# Patient Record
Sex: FEMALE | Race: WHITE | NOT HISPANIC OR LATINO | Employment: FULL TIME | URBAN - METROPOLITAN AREA
[De-identification: names, ages, dates, MRNs, and addresses within clinical notes are randomized per-mention and may not be internally consistent; named-entity substitution may affect disease eponyms.]

---

## 2017-07-31 ENCOUNTER — OFFICE VISIT (OUTPATIENT)
Dept: FAMILY MEDICINE | Facility: CLINIC | Age: 57
End: 2017-07-31
Payer: COMMERCIAL

## 2017-07-31 VITALS
HEART RATE: 101 BPM | OXYGEN SATURATION: 100 % | WEIGHT: 146.4 LBS | DIASTOLIC BLOOD PRESSURE: 74 MMHG | SYSTOLIC BLOOD PRESSURE: 111 MMHG

## 2017-07-31 DIAGNOSIS — M65.30 TRIGGER FINGER, ACQUIRED: ICD-10-CM

## 2017-07-31 DIAGNOSIS — M19.041 OSTEOARTHRITIS OF FINGERS OF BOTH HANDS: Primary | ICD-10-CM

## 2017-07-31 DIAGNOSIS — M19.042 OSTEOARTHRITIS OF FINGERS OF BOTH HANDS: Primary | ICD-10-CM

## 2017-07-31 PROCEDURE — 20551 NJX 1 TENDON ORIGIN/INSJ: CPT | Mod: LT | Performed by: PHYSICIAN ASSISTANT

## 2017-07-31 PROCEDURE — 99213 OFFICE O/P EST LOW 20 MIN: CPT | Mod: 25 | Performed by: PHYSICIAN ASSISTANT

## 2017-07-31 RX ORDER — DICLOFENAC SODIUM 75 MG/1
75 TABLET, DELAYED RELEASE ORAL 2 TIMES DAILY PRN
Qty: 30 TABLET | Refills: 1 | Status: SHIPPED | OUTPATIENT
Start: 2017-07-31 | End: 2017-08-30

## 2017-07-31 NOTE — PROGRESS NOTES
SUBJECTIVE:                                                    Tuyet Hernandez is a 56 year old female who presents to clinic today for the following health issues:      Hand pain      Duration: [id number]    Description (location/character/radiation): both hands ache. Tight feeling.    Intensity:  moderate    Accompanying signs and symptoms: ache,    History (similar episodes/previous evaluation): None    Precipitating or alleviating factors: None    Therapies tried and outcome: Aleve, ibuprofen       No neck pain. Hands feel tight . Some pain. No paresthesias. Trigger finger left 4rth finger.      Problem list and histories reviewed & adjusted, as indicated.  Additional history: as documented    There is no problem list on file for this patient.    History reviewed. No pertinent surgical history.    Social History   Substance Use Topics     Smoking status: Never Smoker     Smokeless tobacco: Never Used     Alcohol use No     History reviewed. No pertinent family history.      BP Readings from Last 3 Encounters:   07/31/17 111/74   08/08/14 131/82    Wt Readings from Last 3 Encounters:   07/31/17 146 lb 6.4 oz (66.4 kg)   08/08/14 154 lb (69.9 kg)                        Reviewed and updated as needed this visit by clinical staff     Reviewed and updated as needed this visit by Provider         All other systems negative except as outline above  OBJECTIVE:  Hands/fingers:  heberden's and rahul's nodes present to varying degrees in all of her fingers. rom normal with some stiffness.    Trigger finger present involving 4rth finger. Palpable thickening and tenderness of flexor tendon.          The risks, benefits and potential complications (including but not limited to, bleeding, infection, pain, scar, damage to adjacent structures, atrophy or necrosis of soft tissue, skin blanching, failure to relieve symptoms) of injection were discussed with the patient. Questions were addressed and answered.The patient elected  to proceed. Written informed consent was obtained. The correct procedural site was identified and confirmed. A Left trigger finger  injection was performed using 1/4mL Depo Medrol 40mg per mL and 1/2mL (0.25% marcaine) of local anesthetic after sterile prep, to the correct procedural site. Sterile bandaid applied. This was tolerated well by the patient. No apparent complications.Did also discuss that if diabetic, recommend close monitoring of blood sugars over the next week as cortisone injections can temporarily elevate blood sugars.      Tuyet was seen today for musculoskeletal problem.    Diagnoses and all orders for this visit:    Osteoarthritis of fingers of both hands  -     diclofenac (VOLTAREN) 75 MG EC tablet; Take 1 tablet (75 mg) by mouth 2 times daily as needed for moderate pain    Trigger finger, acquired  -     INJECTION SINGLE TENDON ORIGIN/INSERTION  -     DEPO MEDROL 40 MG      Advised supportive and symptomatic treatment.  Follow up with Provider - if condition persists or worsens.

## 2017-07-31 NOTE — NURSING NOTE
The following medication was given:     MEDICATION: Depo Medrol 40mg  ROUTE: IM  SITE: trigger finger  DOSE: 1/4 cc  LOT #: [id number]  :  Niels Lily [employer] Ellie  EXPIRATION DATE:  10/2019  NDC#: 7129-5113-92

## 2017-07-31 NOTE — NURSING NOTE
Chief Complaint   Patient presents with     Musculoskeletal Problem       Initial /74  Pulse 101  Wt 146 lb 6.4 oz (66.4 kg)  SpO2 100% There is no height or weight on file to calculate BMI.  Medication Reconciliation: complete     Bob Meza CMA

## 2017-07-31 NOTE — PATIENT INSTRUCTIONS
What is Trigger Finger?  Trigger finger is an inflammation of tissue inside your finger or thumb. It is also called tenosynovitis (ten-oh-sin-oh-VY-tis). Tendons (cordlike fibers that attach muscle to bone and allow you to bend the joints) become swollen. So does the synovium (a slick membrane that allows the tendons to move easily). This makes it difficult to straighten the finger or thumb.    Causes  Repeated use of a tool with strong gripping, such as a drill or wrench, can irritate and inflame the tendons and the synovium. It is also more common in certain medical conditions such as rheumatoid arthritis, gout, and diabetes. But often the cause of trigger finger is unknown.  Inside your finger  Tendons connect muscles in your forearm to the bones in your fingers. The tendons in each finger are surrounded by a protective tendon sheath. This sheath is lined with synovium, which produces a fluid that allows the tendons to slide easily when you bend and straighten the finger. If a tendon is irritated, it becomes inflamed.  When a tendon is inflamed  When a tendon is inflamed, it causes the lining of the tendon sheath to swell and thicken. Or the tendon itself may thicken. Then the sheath pinches the tendon, and the tendon can no longer slide easily inside the sheath. When you straighten your finger, the tendon sticks or  locks  as it tries to squeeze back through the sheath.    Symptoms  The first sign of trigger finger may be pain where the finger or thumb joins the palm. You may also notice some swelling. As the tendon becomes more inflamed, the finger may start to catch when you try to straighten it. When the locked tendon releases, the finger jumps, as if you were releasing the trigger of a gun. This further irritates the tendon, and may set up a cycle of catching and swelling.   Date Last Reviewed: 9/28/2015 2000-2017 [employer]. [address] [place] [address], [place] [address]. All rights reserved.  This information is not intended as a substitute for professional medical care. Always follow your healthcare professional's instructions.        Treating Trigger Finger     The tendon sheath is opened to release the tendon. Once the tendon can move freely again, the finger can bend and straighten more normally.     Trigger finger occurs when the tissue inside your finger or thumb becomes inflamed. Mild cases can be treated without surgery. If the problem is severe, surgery may be needed. Your doctor will discuss your options with you.  Nonsurgical treatment  For mild symptoms, your doctor may have you rest the finger or thumb. You may also be told to take anti-inflammatory medicines. These include ibuprofen or aspirin. You may be given an injection of medicine in the base of the finger or thumb. This typically is a steroid, such as cortisone.  Surgery  If nonsurgical treatments don t ease your symptoms, surgery may be recommended. A tendon is a cordlike fiber that attaches muscle to bone and allows joints to bend. The tendon is surrounded by a protective cover called a sheath. During surgery, the sheath in your finger or thumb is opened to enlarge the space and release the swollen tendon. This allows the finger or thumb to bend and straighten normally. Surgery takes about 20 minutes. It can often be done using a local anesthetic. You may be able to go home the same day. Your hand will be wrapped in a soft bandage. You may need to wear a plaster splint for a short time to keep the finger or thumb still as it heals. The stitches will be removed in about 2 weeks. Your doctor can discuss the risks and benefits of surgery with you.  Date Last Reviewed: 9/21/2015 2000-2017 [employer]. [address] [place] [address]. All rights reserved. This information is not intended as a substitute for professional medical care. Always follow your healthcare professional's instructions.

## 2017-07-31 NOTE — MR AVS SNAPSHOT
After Visit Summary   7/31/2017    Tuyet Hernandez    MRN: [id number]           Patient Information     Date Of Birth          1960        Visit Information        Provider Department      7/31/2017 5:40 PM Wes Lewis PA-C [facility]        Today's Diagnoses     Osteoarthritis of fingers of both hands    -  1    Trigger finger, acquired          Care Instructions      What is Trigger Finger?  Trigger finger is an inflammation of tissue inside your finger or thumb. It is also called tenosynovitis (ten-oh-sin-oh-VY-tis). Tendons (cordlike fibers that attach muscle to bone and allow you to bend the joints) become swollen. So does the synovium (a slick membrane that allows the tendons to move easily). This makes it difficult to straighten the finger or thumb.    Causes  Repeated use of a tool with strong gripping, such as a drill or wrench, can irritate and inflame the tendons and the synovium. It is also more common in certain medical conditions such as rheumatoid arthritis, gout, and diabetes. But often the cause of trigger finger is unknown.  Inside your finger  Tendons connect muscles in your forearm to the bones in your fingers. The tendons in each finger are surrounded by a protective tendon sheath. This sheath is lined with synovium, which produces a fluid that allows the tendons to slide easily when you bend and straighten the finger. If a tendon is irritated, it becomes inflamed.  When a tendon is inflamed  When a tendon is inflamed, it causes the lining of the tendon sheath to swell and thicken. Or the tendon itself may thicken. Then the sheath pinches the tendon, and the tendon can no longer slide easily inside the sheath. When you straighten your finger, the tendon sticks or  locks  as it tries to squeeze back through the sheath.    Symptoms  The first sign of trigger finger may be pain where the finger or thumb joins the palm. You may also notice some swelling. As  the tendon becomes more inflamed, the finger may start to catch when you try to straighten it. When the locked tendon releases, the finger jumps, as if you were releasing the trigger of a gun. This further irritates the tendon, and may set up a cycle of catching and swelling.   Date Last Reviewed: 9/28/2015 2000-2017 [employer]. [address] [place] [address]. All rights reserved. This information is not intended as a substitute for professional medical care. Always follow your healthcare professional's instructions.        Treating Trigger Finger     The tendon sheath is opened to release the tendon. Once the tendon can move freely again, the finger can bend and straighten more normally.     Trigger finger occurs when the tissue inside your finger or thumb becomes inflamed. Mild cases can be treated without surgery. If the problem is severe, surgery may be needed. Your doctor will discuss your options with you.  Nonsurgical treatment  For mild symptoms, your doctor may have you rest the finger or thumb. You may also be told to take anti-inflammatory medicines. These include ibuprofen or aspirin. You may be given an injection of medicine in the base of the finger or thumb. This typically is a steroid, such as cortisone.  Surgery  If nonsurgical treatments don t ease your symptoms, surgery may be recommended. A tendon is a cordlike fiber that attaches muscle to bone and allows joints to bend. The tendon is surrounded by a protective cover called a sheath. During surgery, the sheath in your finger or thumb is opened to enlarge the space and release the swollen tendon. This allows the finger or thumb to bend and straighten normally. Surgery takes about 20 minutes. It can often be done using a local anesthetic. You may be able to go home the same day. Your hand will be wrapped in a soft bandage. You may need to wear a plaster splint for a short time to keep the finger or thumb still as it  "heals. The stitches will be removed in about 2 weeks. Your doctor can discuss the risks and benefits of surgery with you.  Date Last Reviewed: 2015-2017 The [employer]. [address] [place] [address], [place] [address]. All rights reserved. This information is not intended as a substitute for professional medical care. Always follow your healthcare professional's instructions.                Follow-ups after your visit        Who to contact     Normal or non-critical lab and imaging results will be communicated to you by "[employer]hart, letter or phone within 4 business days after the clinic has received the results. If you do not hear from us within 7 days, please contact the clinic through [employer] or phone. If you have a critical or abnormal lab result, we will notify you by phone as soon as possible.  Submit refill requests through [employer] or call your pharmacy and they will forward the refill request to us. Please allow 3 business days for your refill to be completed.          If you need to speak with a  for additional information , please call: [phone]             Additional Information About Your Visit        [employer] Information     [employer] lets you send messages to your doctor, view your test results, renew your prescriptions, schedule appointments and more. To sign up, go to [email or web address] . Click on \"Log in\" on the left side of the screen, which will take you to the Welcome page. Then click on \"Sign up Now\" on the right side of the page.     You will be asked to enter the access code listed below, as well as some personal information. Please follow the directions to create your username and password.     Your access code is: [email or web address]  Expires: 10/29/2017  6:31 PM     Your access code will  in 90 days. If you need help or a new code, please call your [facility] clinic or [phone].        Care EveryWhere ID     This is your Care EveryWhere ID. This could be " used by other organizations to access your [facility] medical records  [id number]        Your Vitals Were     Pulse Pulse Oximetry                101 100%           Blood Pressure from Last 3 Encounters:   07/31/17 111/74   08/08/14 131/82    Weight from Last 3 Encounters:   07/31/17 146 lb 6.4 oz (66.4 kg)   08/08/14 154 lb (69.9 kg)              We Performed the Following     DEPO MEDROL 40 MG     INJECTION SINGLE TENDON ORIGIN/INSERTION          Today's Medication Changes          These changes are accurate as of: 7/31/17  6:31 PM.  If you have any questions, ask your nurse or doctor.               Start taking these medicines.        Dose/Directions    diclofenac 75 MG EC tablet   Commonly known as:  VOLTAREN   Used for:  Osteoarthritis of fingers of both hands   Started by:  Wes Lewis PA-C        Dose:  75 mg   Take 1 tablet (75 mg) by mouth 2 times daily as needed for moderate pain   Quantity:  30 tablet   Refills:  1            Where to get your medicines      These medications were sent to [facility]/pharmacy #[id number] - [place], [place] - [address] [place] [address] AT [address], [place] [address]     Phone:  [phone]     diclofenac 75 MG EC tablet                Primary Care Provider    None Specified       No primary provider on file.        Equal Access to Services     TREVIN MAGDALENO : Hannah maynardo Socate, waaxda luqadaha, qaybta kaalmada juan joseda, arash ventura. So [facility] [phone].    ATENCIÓN: Si habla español, tiene a emery disposición servicios gratuitos de asistencia lingüística. Llduane al [phone].    We comply with applicable federal civil rights laws and [place] laws. We do not discriminate on the basis of race, color, national origin, age, disability sex, sexual orientation or gender identity.            Thank you!     Thank you for choosing [facility] [place]  for your care. Our goal is always to provide you with  excellent care. Hearing back from our patients is one way we can continue to improve our services. Please take a few minutes to complete the written survey that you may receive in the mail after your visit with us. Thank you!             Your Updated Medication List - Protect others around you: Learn how to safely use, store and throw away your medicines at www.disposemymeds.org.          This list is accurate as of: 7/31/17  6:31 PM.  Always use your most recent med list.                   Brand Name Dispense Instructions for use Diagnosis    diclofenac 75 MG EC tablet    VOLTAREN    30 tablet    Take 1 tablet (75 mg) by mouth 2 times daily as needed for moderate pain    Osteoarthritis of fingers of both hands

## 2017-08-30 DIAGNOSIS — M19.041 OSTEOARTHRITIS OF FINGERS OF BOTH HANDS: ICD-10-CM

## 2017-08-30 DIAGNOSIS — M19.042 OSTEOARTHRITIS OF FINGERS OF BOTH HANDS: ICD-10-CM

## 2017-08-30 NOTE — TELEPHONE ENCOUNTER
90 day supply please    Diclofenac      Last Written Prescription Date: 07/31/17  Last Quantity: 30, # refills: 1  Last Office Visit with G, P or [facility] prescribing provider: 07/31/17       No results found for: CR  No results found for: AST  No results found for: ALT  BP Readings from Last 3 Encounters:   07/31/17 111/74   08/08/14 131/82

## 2017-08-30 NOTE — TELEPHONE ENCOUNTER
Routing refill request to provider for review/approval because:  Labs not current:  Creatinine, AST/ALT  Pharmacy requesting 90 day supply  Medication pended 90 tabs with 1 refill for approval.

## 2017-08-31 RX ORDER — DICLOFENAC SODIUM 75 MG/1
75 TABLET, DELAYED RELEASE ORAL 2 TIMES DAILY PRN
Qty: 90 TABLET | Refills: 1 | Status: SHIPPED | OUTPATIENT
Start: 2017-08-31 | End: 2018-03-23

## 2018-03-11 ENCOUNTER — OFFICE VISIT (OUTPATIENT)
Dept: URGENT CARE | Facility: URGENT CARE | Age: 58
End: 2018-03-11
Payer: COMMERCIAL

## 2018-03-11 VITALS
WEIGHT: 145.13 LBS | SYSTOLIC BLOOD PRESSURE: 122 MMHG | OXYGEN SATURATION: 97 % | DIASTOLIC BLOOD PRESSURE: 82 MMHG | TEMPERATURE: 97 F | HEART RATE: 89 BPM

## 2018-03-11 DIAGNOSIS — A09 TRAVELER'S DIARRHEA: Primary | ICD-10-CM

## 2018-03-11 PROCEDURE — 99213 OFFICE O/P EST LOW 20 MIN: CPT | Performed by: FAMILY MEDICINE

## 2018-03-11 RX ORDER — CIPROFLOXACIN 500 MG/1
500 TABLET, FILM COATED ORAL 2 TIMES DAILY
Qty: 6 TABLET | Refills: 0 | Status: SHIPPED | OUTPATIENT
Start: 2018-03-11 | End: 2018-03-14

## 2018-03-11 NOTE — NURSING NOTE
Chief Complaint   Patient presents with     Diarrhea     Friday it started.  Has not eaten since then, unable to keep anything in system.  She has been in [place]. Today already has had 5 BM, is only water at this point.       Initial /82 (BP Location: Right arm, Patient Position: Sitting, Cuff Size: Adult Regular)  Pulse 89  Temp 97  F (36.1  C) (Oral)  Wt 145 lb 2 oz (65.8 kg)  SpO2 97% There is no height or weight on file to calculate BMI.  Medication Reconciliation: christine ARELLANO, Certified Medical Assistant (AAMA)March 11, 2018 9:11 AM

## 2018-03-11 NOTE — PATIENT INSTRUCTIONS
Traveler's Diarrhea (Adult)    Traveler's diarrhea is an infection in the intestinal tract caused by bacteria called E coli. This bacteria is commonly found in water supplies of developing countries. The local people of those countries are used to E coli in the water and don't get sick. Tourists who drink contaminated water or eat foods that were washed or prepared with this water may become very ill.  The illness begins 1 to 3 days after exposure and lasts up to 5 days. Symptoms include fever, vomiting, stomach cramping and watery diarrhea. There may also be blood or mucus in the stool. Mild cases will get better without treatment. Antibiotics are used for more severe cases.  Home care    If you were prescribed antibiotics,  take them until they are finished.    You may use acetaminophen or ibuprofen to control fever, unless another medicine was prescribed. If you have chronic liver or kidney disease or have ever had a stomach ulcer or gastrointestinal  bleeding, talk with the doctor before using these medicines. Aspirin should never be used in anyone under 18 years of age who is ill with a fever. It may cause severe illness or death.    Do not take over-the-counter anti-diarrheal medicines, unless advised by the doctor.  Once vomiting stops, follow these guidelines:  During the first 12 to 24 hours, follow the diet below:    Fruit juices. Apple, grape and cranberry juice; clear fruit drinks, electrolyte replacement and sports drinks.    Beverages. Soft drinks without caffeine; mineral water (plain or flavored); decaffeinated tea and coffee    Soups. Clear broth, consommé and bouillon.    Desserts. Plain gelatin, popsicles and fruit juice bars; As you feel better, you may add 6 to 8 oz of yogurt per day.  During the next 24 hours, you may add the following to the above:    Hot cereal, plain toast, bread, rolls, crackers    Plain noodles, rice, mashed potatoes, chicken noodle or rice soup    Unsweetened canned  fruit (avoid pineapple), bananas    Limit fat intake to less than 15 grams per day by avoiding margarine, butter, oils, mayonnaise, sauces, gravies, fried foods, peanut butter, meat, poultry and fish.    Limit fiber; avoid raw or cooked vegetables, fresh fruits (except bananas) and bran cereals.    Limit caffeine and chocolate. No spices or seasonings except salt.  During the next 24 hours you can gradually resume a normal diet as the symptoms lessen.  Follow-up care  Follow up with your healthcare provider, or as advised. Call if you are not improving within 24 hours or if the diarrhea lasts more than 1 week on antibiotics. If a stool (diarrhea) sample was taken, you may call in 2 days (or as directed) for the results.  When to seek medical advice  Call your healthcare provider if any of these occur:    Severe constant pain in the lower part of the abdomen, on the right side    Blood in diarrhea or vomit, dark coffee ground appearing vomit, or dark tarry stools    continued vomiting (unable to keep liquids down)    Frequent diarrhea (more than 5 times a day); blood (red or black) or mucus in diarrhea    Reduced oral intake    Reduced urine output or extreme thirst    Weakness, dizziness, fainting    Drowsiness, confusion, stiff neck, or seizure    Fever (1 degree above your normal temperature) lasting 24 to 48 hours, or whatever your healthcare provider told you to report based on your condition  Date Last Reviewed: 11/16/2015 2000-2017 [employer]. [address] [place] [address]. All rights reserved. This information is not intended as a substitute for professional medical care. Always follow your healthcare professional's instructions.

## 2018-03-11 NOTE — PROGRESS NOTES
SUBJECTIVE:  Tuyet Hernandez, a 57 year old female scheduled an appointment to discuss the following issues:  Traveler's diarrhea    Medical, social, surgical, and family histories reviewed.    Diarrhea  Friday it started. Has not eaten since then, unable to keep anything in system. She has been in [place]. Today already has had 5 BM, is only water at this point.      As above.  Patient was in [place] until yesterday; onset of diarrhea since the day before.  She is not aware of having eaten or drank anything from the roadside; she stated all inclusive hotel.  Decreased appetite.  No melena or his medication      ROS:  See HPI.  No nausea/vomiting.  No fever/chills.  No chest pain/SOB.  No urine problems.  No dizziness or syncope.      OBJECTIVE:  /82 (BP Location: Right arm, Patient Position: Sitting, Cuff Size: Adult Regular)  Pulse 89  Temp 97  F (36.1  C) (Oral)  Wt 145 lb 2 oz (65.8 kg)  SpO2 97%  EXAM:  GENERAL APPEARANCE:  alert and mild distress; no cyanosis or accessory muscle use, moist mucous membrane  EYES: Eyes grossly normal to inspection, PERRL and conjunctivae and sclerae normal  HENT: ear canals and TM's normal and nose and mouth without ulcers or lesions  NECK: no adenopathy, no asymmetry, masses, or scars and thyroid normal to palpation  RESP: lungs clear to auscultation - no rales, rhonchi or wheezes  CV: regular rates and rhythm, normal S1 S2, no S3 or S4 and no murmur, click or rub  LYMPHATICS: no cervical adenopathy  ABDOMEN: soft, nontender, without hepatosplenomegaly or masses and bowel sounds normal; no CVA tenderness  MS: extremities normal- no gross deformities noted  SKIN: no suspicious lesions or rashes  NEURO: Normal strength and tone, mentation intact and speech normal    ASSESSMENT/PLAN:  (A09) Traveler's diarrhea  (primary encounter diagnosis)  Plan: ciprofloxacin (CIPRO) 500 MG tablet       Care instructions given.  Pt to f/up PCP if no improvement or worsening.  Warning  signs and symptoms explained.--Be seen ASAP if worsening.

## 2018-03-11 NOTE — MR AVS SNAPSHOT
After Visit Summary   3/11/2018    Tuyet Hernandez    MRN: [id number]           Patient Information     Date Of Birth          1960        Visit Information        Provider Department      3/11/2018 9:05 AM Maroc Hinton MD [facility]        Today's Diagnoses     Traveler's diarrhea    -  1      Care Instructions      Traveler's Diarrhea (Adult)    Traveler's diarrhea is an infection in the intestinal tract caused by bacteria called E coli. This bacteria is commonly found in water supplies of developing countries. The local people of those countries are used to E coli in the water and don't get sick. Tourists who drink contaminated water or eat foods that were washed or prepared with this water may become very ill.  The illness begins 1 to 3 days after exposure and lasts up to 5 days. Symptoms include fever, vomiting, stomach cramping and watery diarrhea. There may also be blood or mucus in the stool. Mild cases will get better without treatment. Antibiotics are used for more severe cases.  Home care    If you were prescribed antibiotics,  take them until they are finished.    You may use acetaminophen or ibuprofen to control fever, unless another medicine was prescribed. If you have chronic liver or kidney disease or have ever had a stomach ulcer or gastrointestinal  bleeding, talk with the doctor before using these medicines. Aspirin should never be used in anyone under 18 years of age who is ill with a fever. It may cause severe illness or death.    Do not take over-the-counter anti-diarrheal medicines, unless advised by the doctor.  Once vomiting stops, follow these guidelines:  During the first 12 to 24 hours, follow the diet below:    Fruit juices. Apple, grape and cranberry juice; clear fruit drinks, electrolyte replacement and sports drinks.    Beverages. Soft drinks without caffeine; mineral water (plain or flavored); decaffeinated tea and coffee    Soups. Clear broth,  consommé and bouillon.    Desserts. Plain gelatin, popsicles and fruit juice bars; As you feel better, you may add 6 to 8 oz of yogurt per day.  During the next 24 hours, you may add the following to the above:    Hot cereal, plain toast, bread, rolls, crackers    Plain noodles, rice, mashed potatoes, chicken noodle or rice soup    Unsweetened canned fruit (avoid pineapple), bananas    Limit fat intake to less than 15 grams per day by avoiding margarine, butter, oils, mayonnaise, sauces, gravies, fried foods, peanut butter, meat, poultry and fish.    Limit fiber; avoid raw or cooked vegetables, fresh fruits (except bananas) and bran cereals.    Limit caffeine and chocolate. No spices or seasonings except salt.  During the next 24 hours you can gradually resume a normal diet as the symptoms lessen.  Follow-up care  Follow up with your healthcare provider, or as advised. Call if you are not improving within 24 hours or if the diarrhea lasts more than 1 week on antibiotics. If a stool (diarrhea) sample was taken, you may call in 2 days (or as directed) for the results.  When to seek medical advice  Call your healthcare provider if any of these occur:    Severe constant pain in the lower part of the abdomen, on the right side    Blood in diarrhea or vomit, dark coffee ground appearing vomit, or dark tarry stools    continued vomiting (unable to keep liquids down)    Frequent diarrhea (more than 5 times a day); blood (red or black) or mucus in diarrhea    Reduced oral intake    Reduced urine output or extreme thirst    Weakness, dizziness, fainting    Drowsiness, confusion, stiff neck, or seizure    Fever (1 degree above your normal temperature) lasting 24 to 48 hours, or whatever your healthcare provider told you to report based on your condition  Date Last Reviewed: 11/16/2015 2000-2017 [employer] [address] [place] [address], [place] [address]. All rights reserved. This information is not intended as a  "substitute for professional medical care. Always follow your healthcare professional's instructions.                Follow-ups after your visit        Who to contact     If you have questions or need follow up information about today's clinic visit or your schedule please contact [facility] directly at [phone].  Normal or non-critical lab and imaging results will be communicated to you by MyChart, letter or phone within 4 business days after the clinic has received the results. If you do not hear from us within 7 days, please contact the clinic through MyChart or phone. If you have a critical or abnormal lab result, we will notify you by phone as soon as possible.  Submit refill requests through NUOFFER or call your pharmacy and they will forward the refill request to us. Please allow 3 business days for your refill to be completed.          Additional Information About Your Visit        MyChart Information     NUOFFER lets you send messages to your doctor, view your test results, renew your prescriptions, schedule appointments and more. To sign up, go to [email or web address] . Click on \"Log in\" on the left side of the screen, which will take you to the Welcome page. Then click on \"Sign up Now\" on the right side of the page.     You will be asked to enter the access code listed below, as well as some personal information. Please follow the directions to create your username and password.     Your access code is: [email or web address]  Expires: 2018  9:28 AM     Your access code will  in 90 days. If you need help or a new code, please call your [facility] or [phone].        Care EveryWhere ID     This is your Care EveryWhere ID. This could be used by other organizations to access your [facility] medical records  [id number]        Your Vitals Were     Pulse Temperature Pulse Oximetry             89 97  F (36.1  C) (Oral) 97%          Blood Pressure from Last 3 Encounters:   18 " 122/82   07/31/17 111/74   08/08/14 131/82    Weight from Last 3 Encounters:   03/11/18 145 lb 2 oz (65.8 kg)   07/31/17 146 lb 6.4 oz (66.4 kg)   08/08/14 154 lb (69.9 kg)              Today, you had the following     No orders found for display         Today's Medication Changes          These changes are accurate as of 3/11/18  9:28 AM.  If you have any questions, ask your nurse or doctor.               Start taking these medicines.        Dose/Directions    ciprofloxacin 500 MG tablet   Commonly known as:  CIPRO   Used for:  Traveler's diarrhea   Started by:  Marco Hinton MD        Dose:  500 mg   Take 1 tablet (500 mg) by mouth 2 times daily for 3 days   Quantity:  6 tablet   Refills:  0            Where to get your medicines      These medications were sent to [facility] [address] [place] [address] [place], [place] - [address] [facility] AT [address]  [address] [facility] [place] [address]     Phone:  [phone]     ciprofloxacin 500 MG tablet                Primary Care Provider Office Phone # Fax #    [facility] [phone]       [address] [place] [address]        Equal Access to Services     [facility]: Hadii aad ku hadasho Soomaali, waaxda luqadaha, qaybta kaalmada adeegyada, waxay idiin hayaan brice houston . So [facility] [phone].    ATENCIÓN: Si habla español, tiene a emery disposición servicios gratuitos de asistencia lingüística. Llduane al [phone].    We comply with applicable federal civil rights laws and [place] laws. We do not discriminate on the basis of race, color, national origin, age, disability, sex, sexual orientation, or gender identity.            Thank you!     Thank you for choosing [facility]  [facility] your care. Our goal is always to provide you with excellent care. Hearing back from our patients is one way we can continue to improve our services. Please take a few minutes to complete the written survey that you may  receive in the mail after your visit with us. Thank you!             Your Updated Medication List - Protect others around you: Learn how to safely use, store and throw away your medicines at www.disposemymeds.org.          This list is accurate as of 3/11/18  9:28 AM.  Always use your most recent med list.                   Brand Name Dispense Instructions for use Diagnosis    ciprofloxacin 500 MG tablet    CIPRO    6 tablet    Take 1 tablet (500 mg) by mouth 2 times daily for 3 days    Traveler's diarrhea       diclofenac 75 MG EC tablet    VOLTAREN    90 tablet    Take 1 tablet (75 mg) by mouth 2 times daily as needed for moderate pain    Osteoarthritis of fingers of both hands

## 2018-03-23 ENCOUNTER — OFFICE VISIT (OUTPATIENT)
Dept: FAMILY MEDICINE | Facility: CLINIC | Age: 58
End: 2018-03-23
Payer: COMMERCIAL

## 2018-03-23 VITALS
SYSTOLIC BLOOD PRESSURE: 110 MMHG | HEART RATE: 71 BPM | RESPIRATION RATE: 16 BRPM | BODY MASS INDEX: 22.73 KG/M2 | DIASTOLIC BLOOD PRESSURE: 70 MMHG | HEIGHT: 68 IN | OXYGEN SATURATION: 100 % | WEIGHT: 150 LBS

## 2018-03-23 DIAGNOSIS — M25.562 ACUTE PAIN OF LEFT KNEE: ICD-10-CM

## 2018-03-23 DIAGNOSIS — M65.30 TRIGGER FINGER, ACQUIRED: Primary | ICD-10-CM

## 2018-03-23 PROCEDURE — 99213 OFFICE O/P EST LOW 20 MIN: CPT | Mod: 25 | Performed by: PHYSICIAN ASSISTANT

## 2018-03-23 PROCEDURE — 20610 DRAIN/INJ JOINT/BURSA W/O US: CPT | Performed by: PHYSICIAN ASSISTANT

## 2018-03-23 RX ORDER — DICLOFENAC SODIUM 75 MG/1
75 TABLET, DELAYED RELEASE ORAL 2 TIMES DAILY PRN
Qty: 30 TABLET | Refills: 1 | Status: SHIPPED | OUTPATIENT
Start: 2018-03-23 | End: 2018-05-11

## 2018-03-23 NOTE — MR AVS SNAPSHOT
After Visit Summary   3/23/2018    Tuyet Hernandez    MRN: [id number]           Patient Information     Date Of Birth          1960        Visit Information        Provider Department      3/23/2018 4:00 PM Wes Lewis PA-C [facility]        Today's Diagnoses     Trigger finger, acquired    -  1    Acute pain of left knee           Follow-ups after your visit        Additional Services     ORTHOPEDICS ADULT REFERRAL       Your provider has referred you to: [facility] - Be [phone]   [email or web address]    Please be aware that coverage of these services is subject to the terms and limitations of your health insurance plan.  Call member services at your health plan with any benefit or coverage questions.      Please bring the following to your appointment:    >>   Any x-rays, CTs or MRIs which have been performed.  Contact the facility where they were done to arrange for  prior to your scheduled appointment.    >>   List of current medications   >>   This referral request   >>   Any documents/labs given to you for this referral                  Who to contact     Normal or non-critical lab and imaging results will be communicated to you by Hinacomhart, letter or phone within 4 business days after the clinic has received the results. If you do not hear from us within 7 days, please contact the clinic through Hinacomhart or phone. If you have a critical or abnormal lab result, we will notify you by phone as soon as possible.  Submit refill requests through SendGrid or call your pharmacy and they will forward the refill request to us. Please allow 3 business days for your refill to be completed.          If you need to speak with a  for additional information , please call: [phone]             Additional Information About Your Visit        HinacomharAppUpper - ASO Information     SendGrid lets you send messages to  "your doctor, view your test results, renew your prescriptions, schedule appointments and more. To sign up, go to [email or web address] . Click on \"Log in\" on the left side of the screen, which will take you to the Welcome page. Then click on \"Sign up Now\" on the right side of the page.     You will be asked to enter the access code listed below, as well as some personal information. Please follow the directions to create your username and password.     Your access code is: [email or web address]  Expires: 2018  9:28 AM     Your access code will  in 90 days. If you need help or a new code, please call your [facility] clinic or [phone].        Care EveryWhere ID     This is your Care EveryWhere ID. This could be used by other organizations to access your [facility] medical records  [id number]        Your Vitals Were     Pulse Respirations Height Pulse Oximetry BMI (Body Mass Index)       71 16 5' 8\" (1.727 m) 100% 22.81 kg/m2        Blood Pressure from Last 3 Encounters:   18 110/70   18 122/82   17 111/74    Weight from Last 3 Encounters:   18 150 lb (68 kg)   18 145 lb 2 oz (65.8 kg)   17 146 lb 6.4 oz (66.4 kg)              We Performed the Following     Colonoscopy - HIM Scan     Colonoscopy - HIM Scan     Colonoscopy - HIM Scan     DEPO MEDROL 40 MG     DRAIN/INJECT LARGE JOINT/BURSA     Mammogram - HIM Scan     Mammogram - HIM Scan     ORTHOPEDICS ADULT REFERRAL          Where to get your medicines      These medications were sent to [facility] [address] - [place] - [address] [facility] AT [address]  [address] [facility] [place] [address]     Phone:  [phone]     diclofenac 75 MG EC tablet          Primary Care Provider Office Phone # Fax #    [facility] [phone] [address] [facility] [place] [address]        Equal Access to Services     TREVIN MAGDALENO AH: Hannah Lisa nikolas cardoso, [facility] " arash bergerdavid houston ah. So [facility] [phone].    ATENCIÓN: Si dionne fields, tiene a emery disposición servicios gratuitos de asistencia lingüística. Andreina al [phone].    We comply with applicable federal civil rights laws and [place] laws. We do not discriminate on the basis of race, color, national origin, age, disability, sex, sexual orientation, or gender identity.            Thank you!     Thank you for choosing [facility]  for your care. Our goal is always to provide you with excellent care. Hearing back from our patients is one way we can continue to improve our services. Please take a few minutes to complete the written survey that you may receive in the mail after your visit with us. Thank you!             Your Updated Medication List - Protect others around you: Learn how to safely use, store and throw away your medicines at www.disposemymeds.org.          This list is accurate as of 3/23/18  5:07 PM.  Always use your most recent med list.                   Brand Name Dispense Instructions for use Diagnosis    diclofenac 75 MG EC tablet    VOLTAREN    30 tablet    Take 1 tablet (75 mg) by mouth 2 times daily as needed for moderate pain    Acute pain of left knee

## 2018-04-16 ENCOUNTER — TRANSFERRED RECORDS (OUTPATIENT)
Dept: HEALTH INFORMATION MANAGEMENT | Facility: CLINIC | Age: 58
End: 2018-04-16

## 2018-05-07 ENCOUNTER — TRANSFERRED RECORDS (OUTPATIENT)
Dept: HEALTH INFORMATION MANAGEMENT | Facility: CLINIC | Age: 58
End: 2018-05-07

## 2018-05-11 ENCOUNTER — OFFICE VISIT (OUTPATIENT)
Dept: FAMILY MEDICINE | Facility: CLINIC | Age: 58
End: 2018-05-11
Payer: COMMERCIAL

## 2018-05-11 VITALS
BODY MASS INDEX: 22.58 KG/M2 | HEIGHT: 68 IN | SYSTOLIC BLOOD PRESSURE: 129 MMHG | RESPIRATION RATE: 17 BRPM | DIASTOLIC BLOOD PRESSURE: 74 MMHG | TEMPERATURE: 98.5 F | HEART RATE: 77 BPM | WEIGHT: 149 LBS | OXYGEN SATURATION: 98 %

## 2018-05-11 DIAGNOSIS — S83.232D COMPLEX TEAR OF MEDIAL MENISCUS OF LEFT KNEE AS CURRENT INJURY, SUBSEQUENT ENCOUNTER: ICD-10-CM

## 2018-05-11 DIAGNOSIS — M65.30 TRIGGER FINGER, ACQUIRED: ICD-10-CM

## 2018-05-11 DIAGNOSIS — Z01.818 PREOP GENERAL PHYSICAL EXAM: Primary | ICD-10-CM

## 2018-05-11 PROCEDURE — 93000 ELECTROCARDIOGRAM COMPLETE: CPT | Performed by: PHYSICIAN ASSISTANT

## 2018-05-11 PROCEDURE — 99214 OFFICE O/P EST MOD 30 MIN: CPT | Performed by: PHYSICIAN ASSISTANT

## 2018-05-11 NOTE — MR AVS SNAPSHOT
After Visit Summary   5/11/2018    Tuyet Hernandez    MRN: [id number]           Patient Information     Date Of Birth          1960        Visit Information        Provider Department      5/11/2018 1:20 PM Wes Lewis PA-C [facility]        Today's Diagnoses     Preop general physical exam    -  1    Complex tear of medial meniscus of left knee as current injury, subsequent encounter        Trigger finger, acquired          Care Instructions      Before Your Surgery      Call your surgeon if there is any change in your health. This includes signs of a cold or flu (such as a sore throat, runny nose, cough, rash or fever).    Do not smoke, drink alcohol or take over the counter medicine (unless your surgeon or primary care doctor tells you to) for the 24 hours before and after surgery.    If you take prescribed drugs: Follow your doctor s orders about which medicines to take and which to stop until after surgery.    Eating and drinking prior to surgery: follow the instructions from your surgeon    Take a shower or bath the night before surgery. Use the soap your surgeon gave you to gently clean your skin. If you do not have soap from your surgeon, use your regular soap. Do not shave or scrub the surgery site.  Wear clean pajamas and have clean sheets on your bed.     Before Your Surgery      Call your surgeon if there is any change in your health. This includes signs of a cold or flu (such as a sore throat, runny nose, cough, rash or fever).    Do not smoke, drink alcohol or take over the counter medicine (unless your surgeon or primary care doctor tells you to) for the 24 hours before and after surgery.    If you take prescribed drugs: Follow your doctor s orders about which medicines to take and which to stop until after surgery.    Eating and drinking prior to surgery: follow the instructions from your surgeon    Take a shower or bath the night before surgery. Use the soap  "your surgeon gave you to gently clean your skin. If you do not have soap from your surgeon, use your regular soap. Do not shave or scrub the surgery site.  Wear clean pajamas and have clean sheets on your bed.           Follow-ups after your visit        Who to contact     Normal or non-critical lab and imaging results will be communicated to you by Vodat Internationalhart, letter or phone within 4 business days after the clinic has received the results. If you do not hear from us within 7 days, please contact the clinic through Vodat Internationalhart or phone. If you have a critical or abnormal lab result, we will notify you by phone as soon as possible.  Submit refill requests through Likeastore or call your pharmacy and they will forward the refill request to us. Please allow 3 business days for your refill to be completed.          If you need to speak with a  for additional information , please call: [phone]             Additional Information About Your Visit        Likeastore Information     Likeastore lets you send messages to your doctor, view your test results, renew your prescriptions, schedule appointments and more. To sign up, go to [email or web address] . Click on \"Log in\" on the left side of the screen, which will take you to the Welcome page. Then click on \"Sign up Now\" on the right side of the page.     You will be asked to enter the access code listed below, as well as some personal information. Please follow the directions to create your username and password.     Your access code is: [email or web address]  Expires: 2018  9:28 AM     Your access code will  in 90 days. If you need help or a new code, please call your [place] clinic or [phone].        Care EveryWhere ID     This is your Care EveryWhere ID. This could be used by other organizations to access your [place] medical records  [id number]        Your Vitals Were     Pulse Temperature Respirations Height Pulse Oximetry BMI (Body Mass Index)    77 " "98.5  F (36.9  C) (Tympanic) 17 5' 8\" (1.727 m) 98% 22.66 kg/m2       Blood Pressure from Last 3 Encounters:   05/11/18 129/74   03/23/18 110/70   03/11/18 122/82    Weight from Last 3 Encounters:   05/11/18 149 lb (67.6 kg)   03/23/18 150 lb (68 kg)   03/11/18 145 lb 2 oz (65.8 kg)              We Performed the Following     EKG 12-lead complete w/read - Clinics          Today's Medication Changes          These changes are accurate as of 5/11/18  2:00 PM.  If you have any questions, ask your nurse or doctor.               Stop taking these medicines if you haven't already. Please contact your care team if you have questions.     diclofenac 75 MG EC tablet   Commonly known as:  VOLTAREN   Stopped by:  Wes Lewis PA-C                    Primary Care Provider Office Phone # Fax #    Wes Lewis PA-C [phone]       [address] [facility] [address]        Equal Access to Services     [facility]: Hadii anuradha jeffries hadasho Soomaali, waaxda luqadaha, qaybta kaalmada adenolan, arash houston . So [facility] [place] [facility] [phone].    ATENCIÓN: Si habla español, tiene a emery disposición servicios gratuitos de asistencia lingüística. [facility] [phone].    We comply with applicable federal civil rights laws and [place] laws. We do not discriminate on the basis of race, color, national origin, age, disability, sex, sexual orientation, or gender identity.            Thank you!     Thank you for choosing [facility]  for your care. Our goal is always to provide you with excellent care. Hearing back from our patients is one way we can continue to improve our services. Please take a few minutes to complete the written survey that you may receive in the mail after your visit with us. Thank you!             Your Updated Medication List - Protect others around you: Learn how to safely use, store and throw away your medicines at www.disposemymeds.org.      Notice  As of " 5/11/2018  2:00 PM    You have not been prescribed any medications.

## 2018-05-11 NOTE — PROGRESS NOTES
"[facility]  [address] [place] [address]  [phone]  Dept: [phone]    PRE-OP EVALUATION:  Today's date: 2018    Tuyet Hernandez (: 1960) presents for pre-operative evaluation assessment as requested by  at [facility].  She requires evaluation and anesthesia risk assessment prior to undergoing surgery/procedure for treatment of  Trigger finger debridement/release and  Left knee medial meniscal tear repair .      HPI:           See problem list for active medical problems.  Problems all longstanding and stable, except as noted/documented.  See ROS for pertinent symptoms related to these conditions.                                                                                                                                                          .    MEDICAL HISTORY:   There are no active problems to display for this patient.     No past medical history on file.  No past surgical history on file.  No current outpatient prescriptions on file.     OTC products: None, except as noted above    No Known Allergies   Latex Allergy: NO    Social History   Substance Use Topics     Smoking status: Never Smoker     Smokeless tobacco: Never Used     Alcohol use No     History   Drug Use No       REVIEW OF SYSTEMS:   Constitutional, neuro, ENT, endocrine, pulmonary, cardiac, gastrointestinal, genitourinary, musculoskeletal, integument and psychiatric systems are negative, except as otherwise noted.    EXAM:   /74  Pulse 77  Temp 98.5  F (36.9  C) (Tympanic)  Resp 17  Ht 5' 8\" (1.727 m)  Wt 149 lb (67.6 kg)  SpO2 98%  BMI 22.66 kg/m2    GENERAL APPEARANCE: healthy, alert and no distress     EYES: EOMI, PERRL     HENT: ear canals and TM's normal and nose and mouth without ulcers or lesions     NECK: no adenopathy, no asymmetry, masses, or scars and thyroid normal to palpation     RESP: lungs clear to auscultation - no rales, rhonchi or wheezes     CV: regular rates " and rhythm, normal S1 S2, no S3 or S4 and no murmur, click or rub     ABDOMEN:  soft, nontender, no HSM or masses and bowel sounds normal     SKIN: no suspicious lesions or rashes     NEURO: Normal strength and tone, sensory exam grossly normal, mentation intact and speech normal     PSYCH: mentation appears normal. and affect normal/bright     LYMPHATICS: No cervical adenopathy    DIAGNOSTICS:       No results for input(s): HGB, PLT, INR, NA, POTASSIUM, CR, A1C in the last 63585 hours.     IMPRESSION:       The proposed surgical procedure is considered INTERMEDIATE risk.    REVISED CARDIAC RISK INDEX  The patient has the following serious cardiovascular risks for perioperative complications such as (MI, PE, VFib and 3  AV Block):  No serious cardiac risks  INTERPRETATION: 0 risks: Class I (very low risk - 0.4% complication rate)    The patient has the following additional risks for perioperative complications:  The ASCVD Risk score (Elpidiodonovan RAMIREZ Jr, et al., 2013) failed to calculate for the following reasons:    Cannot find a previous HDL lab    Cannot find a previous total cholesterol lab      ICD-10-CM    1. Preop general physical exam Z01.818 EKG 12-lead complete w/read - Clinics   2. Complex tear of medial meniscus of left knee as current injury, subsequent encounter S83.232D    3. Trigger finger, acquired M65.30        RECOMMENDATIONS:             APPROVAL GIVEN to proceed with proposed procedure, without further diagnostic evaluation       Signed Electronically by: Wes Lewis PA-C    Copy of this evaluation report is provided to requesting physician.    [place] Preop Guidelines    Revised Cardiac Risk Index    [facility]  [address] [address]  [phone]  Dept: [phone]    PRE-OP EVALUATION:  Today's date: 2018    Tuyet Hernandez (: 1960) presents for pre-operative evaluation assessment as requested by Dr. Montes.  She requires evaluation and  anesthesia risk assessment prior to undergoing surgery/procedure for treatment of left knee .    Proposed Surgery/ Procedure: repair torn meniscus left and trigger finger repair  Date of Surgery/ Procedure: 05/17/18  Time of Surgery/ Procedure: [facility]  Hospital/Surgical Facility: [facility]  Fax number for surgical facility:   Primary Physician: Wes Lewis  Type of Anesthesia Anticipated: to be determined    Patient has a Health Care Directive or Living Will:  NO    1. NO - Do you have a history of heart attack, stroke, stent, bypass or surgery on an artery in the head, neck, heart or legs?  2. NO - Do you ever have any pain or discomfort in your chest?  3. NO - Do you have a history of  Heart Failure?  4. NO - Are you troubled by shortness of breath when: walking on the level, up a slight hill or at night?  5. NO - Do you currently have a cold, bronchitis or other respiratory infection?  6. NO - Do you have a cough, shortness of breath or wheezing?  7. NO - Do you sometimes get pains in the calves of your legs when you walk?  8. NO - Do you or anyone in your family have previous history of blood clots?  9. NO - Do you or does anyone in your family have a serious bleeding problem such as prolonged bleeding following surgeries or cuts?  10. NO - Have you ever had problems with anemia or been told to take iron pills?  11. NO - Have you had any abnormal blood loss such as black, tarry or bloody stools, or abnormal vaginal bleeding?  12. NO - Have you ever had a blood transfusion?  13. NO - Have you or any of your relatives ever had problems with anesthesia?  14. NO - Do you have sleep apnea, excessive snoring or daytime drowsiness?  15. NO - Do you have any prosthetic heart valves?  16. NO - Do you have prosthetic joints?  17. NO - Is there any chance that you may be pregnant?      HPI:           See problem list for active medical problems.  Problems all longstanding and stable, except as noted/documented.   "See ROS for pertinent symptoms related to these conditions.                                                                                                                                                          .    MEDICAL HISTORY:   There are no active problems to display for this patient.     No past medical history on file.  No past surgical history on file.  No current outpatient prescriptions on file.     OTC products: None, except as noted above    No Known Allergies   Latex Allergy: NO    Social History   Substance Use Topics     Smoking status: Never Smoker     Smokeless tobacco: Never Used     Alcohol use No     History   Drug Use No       REVIEW OF SYSTEMS:   Constitutional, neuro, ENT, endocrine, pulmonary, cardiac, gastrointestinal, genitourinary, musculoskeletal, integument and psychiatric systems are negative, except as otherwise noted.    EXAM:   /74  Pulse 77  Temp 98.5  F (36.9  C) (Tympanic)  Resp 17  Ht 5' 8\" (1.727 m)  Wt 149 lb (67.6 kg)  SpO2 98%  BMI 22.66 kg/m2    GENERAL APPEARANCE: healthy, alert and no distress     EYES: EOMI, PERRL     HENT: ear canals and TM's normal and nose and mouth without ulcers or lesions     NECK: no adenopathy, no asymmetry, masses, or scars and thyroid normal to palpation     RESP: lungs clear to auscultation - no rales, rhonchi or wheezes     CV: regular rates and rhythm, normal S1 S2, no S3 or S4 and no murmur, click or rub     ABDOMEN:  soft, nontender, no HSM or masses and bowel sounds normal     SKIN: no suspicious lesions or rashes     NEURO: Normal strength and tone, sensory exam grossly normal, mentation intact and speech normal     PSYCH: mentation appears normal. and affect normal/bright     LYMPHATICS: No cervical adenopathy    DIAGNOSTICS:       No results for input(s): HGB, PLT, INR, NA, POTASSIUM, CR, A1C in the last 40826 hours.     IMPRESSION:       The proposed surgical procedure is considered INTERMEDIATE risk.    REVISED " CARDIAC RISK INDEX  The patient has the following serious cardiovascular risks for perioperative complications such as (MI, PE, VFib and 3  AV Block):  No serious cardiac risks  INTERPRETATION: 0 risks: Class I (very low risk - 0.4% complication rate)    The patient has the following additional risks for perioperative complications:  The ASCVD Risk score (Elpidio RAMIREZ Jr, et al., 2013) failed to calculate for the following reasons:    Cannot find a previous HDL lab    Cannot find a previous total cholesterol lab      ICD-10-CM    1. Preop general physical exam Z01.818 EKG 12-lead complete w/read - Clinics   2. Complex tear of medial meniscus of left knee as current injury, subsequent encounter S83.232D    3. Trigger finger, acquired M65.30        RECOMMENDATIONS:             APPROVAL GIVEN to proceed with proposed procedure, without further diagnostic evaluation       Signed Electronically by: Wes Lweis PA-C    Copy of this evaluation report is provided to requesting physician.    [place] Preop Guidelines    Revised Cardiac Risk Index

## 2018-05-12 ENCOUNTER — HEALTH MAINTENANCE LETTER (OUTPATIENT)
Age: 58
End: 2018-05-12

## 2018-05-15 ENCOUNTER — TELEPHONE (OUTPATIENT)
Dept: FAMILY MEDICINE | Facility: CLINIC | Age: 58
End: 2018-05-15

## 2018-05-15 NOTE — TELEPHONE ENCOUNTER
[facility] surgery calling to get the EKG patient had on Friday 5/11/18. She asked if it can be sent in landscape form, and she says it needs to be copied first, then sent because apparently the radiologist/cardiologist cannot read if just sent from [facility]. Please call to discuss. Send via FAX: [phone] . Thank you

## 2018-05-30 ENCOUNTER — TRANSFERRED RECORDS (OUTPATIENT)
Dept: HEALTH INFORMATION MANAGEMENT | Facility: CLINIC | Age: 58
End: 2018-05-30

## 2018-10-15 ENCOUNTER — OFFICE VISIT (OUTPATIENT)
Dept: FAMILY MEDICINE | Facility: CLINIC | Age: 58
End: 2018-10-15
Payer: COMMERCIAL

## 2018-10-15 ENCOUNTER — RADIANT APPOINTMENT (OUTPATIENT)
Dept: CT IMAGING | Facility: CLINIC | Age: 58
End: 2018-10-15
Attending: FAMILY MEDICINE
Payer: COMMERCIAL

## 2018-10-15 VITALS
OXYGEN SATURATION: 100 % | BODY MASS INDEX: 23.51 KG/M2 | HEART RATE: 65 BPM | DIASTOLIC BLOOD PRESSURE: 80 MMHG | TEMPERATURE: 98.2 F | WEIGHT: 154.6 LBS | SYSTOLIC BLOOD PRESSURE: 132 MMHG

## 2018-10-15 DIAGNOSIS — R10.31 ABDOMINAL PAIN, RIGHT LOWER QUADRANT: Primary | ICD-10-CM

## 2018-10-15 DIAGNOSIS — R10.31 ABDOMINAL PAIN, RIGHT LOWER QUADRANT: ICD-10-CM

## 2018-10-15 LAB
ALBUMIN UR-MCNC: NEGATIVE MG/DL
APPEARANCE UR: CLEAR
BILIRUB UR QL STRIP: NEGATIVE
COLOR UR AUTO: YELLOW
CRP SERPL-MCNC: <2.9 MG/L (ref 0–8)
ERYTHROCYTE [DISTWIDTH] IN BLOOD BY AUTOMATED COUNT: 12.3 % (ref 10–15)
GLUCOSE UR STRIP-MCNC: NEGATIVE MG/DL
HCT VFR BLD AUTO: 40.4 % (ref 35–47)
HGB BLD-MCNC: 13.4 G/DL (ref 11.7–15.7)
HGB UR QL STRIP: ABNORMAL
KETONES UR STRIP-MCNC: NEGATIVE MG/DL
LEUKOCYTE ESTERASE UR QL STRIP: NEGATIVE
MCH RBC QN AUTO: 30.9 PG (ref 26.5–33)
MCHC RBC AUTO-ENTMCNC: 33.2 G/DL (ref 31.5–36.5)
MCV RBC AUTO: 93 FL (ref 78–100)
NITRATE UR QL: NEGATIVE
NON-SQ EPI CELLS #/AREA URNS LPF: NORMAL /LPF
PH UR STRIP: 7 PH (ref 5–7)
PLATELET # BLD AUTO: 240 10E9/L (ref 150–450)
RBC # BLD AUTO: 4.33 10E12/L (ref 3.8–5.2)
RBC #/AREA URNS AUTO: NORMAL /HPF
SOURCE: ABNORMAL
SP GR UR STRIP: 1.01 (ref 1–1.03)
UROBILINOGEN UR STRIP-ACNC: 0.2 EU/DL (ref 0.2–1)
WBC # BLD AUTO: 5.1 10E9/L (ref 4–11)
WBC #/AREA URNS AUTO: NORMAL /HPF

## 2018-10-15 PROCEDURE — 99214 OFFICE O/P EST MOD 30 MIN: CPT | Performed by: FAMILY MEDICINE

## 2018-10-15 PROCEDURE — 86140 C-REACTIVE PROTEIN: CPT | Performed by: FAMILY MEDICINE

## 2018-10-15 PROCEDURE — 74177 CT ABD & PELVIS W/CONTRAST: CPT | Mod: TC

## 2018-10-15 PROCEDURE — 36415 COLL VENOUS BLD VENIPUNCTURE: CPT | Performed by: FAMILY MEDICINE

## 2018-10-15 PROCEDURE — 85027 COMPLETE CBC AUTOMATED: CPT | Performed by: FAMILY MEDICINE

## 2018-10-15 PROCEDURE — 81001 URINALYSIS AUTO W/SCOPE: CPT | Performed by: FAMILY MEDICINE

## 2018-10-15 RX ORDER — IOPAMIDOL 755 MG/ML
76 INJECTION, SOLUTION INTRAVASCULAR ONCE
Status: COMPLETED | OUTPATIENT
Start: 2018-10-15 | End: 2018-10-15

## 2018-10-15 RX ADMIN — IOPAMIDOL 76 ML: 755 INJECTION, SOLUTION INTRAVASCULAR at 12:36

## 2018-10-15 NOTE — LETTER
October 15, 2018      Tuyet Hernandez  [address] [address] [address]        To Whom It May Concern:      Tyuet Hernandez  was seen in the clinic today.      If you have any additional questions or concerns today, please do not hesitate to contact us.        Sincerely,        Sandra Shaw MD

## 2018-10-15 NOTE — LETTER
October 17, 2018      Tuyet Hernandez  [address]  [place] [address]        Dear ,    We are writing to inform you of your test results.    Labs came back normal.     Resulted Orders   CBC with platelets   Result Value Ref Range    WBC 5.1 4.0 - 11.0 10e9/L    RBC Count 4.33 3.8 - 5.2 10e12/L    Hemoglobin 13.4 11.7 - 15.7 g/dL    Hematocrit 40.4 35.0 - 47.0 %    MCV 93 78 - 100 fl    MCH 30.9 26.5 - 33.0 pg    MCHC 33.2 31.5 - 36.5 g/dL    RDW 12.3 10.0 - 15.0 %    Platelet Count 240 150 - 450 10e9/L   CRP inflammation   Result Value Ref Range    CRP Inflammation <2.9 0.0 - 8.0 mg/L   UA reflex to Microscopic and Culture   Result Value Ref Range    Color Urine Yellow     Appearance Urine Clear     Glucose Urine Negative NEG^Negative mg/dL    Bilirubin Urine Negative NEG^Negative    Ketones Urine Negative NEG^Negative mg/dL    Specific Gravity Urine 1.010 1.003 - 1.035    Blood Urine Trace (A) NEG^Negative    pH Urine 7.0 5.0 - 7.0 pH    Protein Albumin Urine Negative NEG^Negative mg/dL    Urobilinogen Urine 0.2 0.2 - 1.0 EU/dL    Nitrite Urine Negative NEG^Negative    Leukocyte Esterase Urine Negative NEG^Negative    Source Midstream Urine    Urine Microscopic   Result Value Ref Range    WBC Urine 0 - 5 OTO5^0 - 5 /HPF    RBC Urine O - 2 OTO2^O - 2 /HPF    Squamous Epithelial /LPF Urine Few FEW^Few /LPF       If you have any questions or concerns, please call the clinic at the number listed above.       Sincerely,        Sandra Shaw MD/lashaeo

## 2018-10-15 NOTE — PROGRESS NOTES
SUBJECTIVE:   Tuyet Hernandez is a 57 year old female who presents to clinic today for the following health issues:      ABDOMINAL PAIN     Onset: x1 week    Description:   Character: Cramping  Location: pelvic region, mostly right  Radiation: right side- back     Intensity: mild     Progression of Symptoms:  same    Accompanying Signs & Symptoms:  Fever/Chills?: no   Gas/Bloating: no   Nausea: YES- x1 week ago with severe cramping when symptoms started  Vomitting: no   Diarrhea?: no   Constipation:no   Dysuria or Hematuria: no    History:   Trauma: no   Previous similar pain: no    Previous tests done: none    Precipitating factors:   Does the pain change with:     Food: no      BM: no     Urination: no     Alleviating factors:  none    Therapies Tried and outcome: none    LMP:  not applicable. In menopause x 2 years.        Denies any previous abdominal surgeries. States that she is otherwise healthy.     Requesting doctors note for today's visit, will be returning after appointment.           Problem list and histories reviewed & adjusted, as indicated.  Additional history: as documented    Patient Active Problem List   Diagnosis     Abdominal pain, right lower quadrant     History reviewed. No pertinent surgical history.    Social History   Substance Use Topics     Smoking status: Never Smoker     Smokeless tobacco: Never Used     Alcohol use No     No family history on file.      No current outpatient prescriptions on file.     No Known Allergies    Reviewed and updated as needed this visit by clinical staff  Tobacco  Allergies  Meds  Problems  Surg Hx       Reviewed and updated as needed this visit by Provider  Allergies  Meds  Problems  Surg Hx         ROS:  All others are negative except as above.        OBJECTIVE:     /80  Pulse 65  Temp 98.2  F (36.8  C) (Tympanic)  Wt 154 lb 9.6 oz (70.1 kg)  SpO2 100%  Breastfeeding? No  BMI 23.51 kg/m2  Body mass index is 23.51  kg/(m^2).  GENERAL: healthy, alert and no distress  RESP: lungs clear to auscultation - no rales, rhonchi or wheezes  CV: regular rate and rhythm, normal S1 S2, no S3 or S4, no murmur, click or rub, no peripheral edema and peripheral pulses strong  ABDOMEN: soft, tenderness to palpation in the right lower quadrant with McBurney's point tenderness. No guarding/rebound tenderness, no hepatosplenomegaly, no masses and bowel sounds normal  PSYCH: mentation appears normal, affect normal/bright    Diagnostic Test Results:  Reviewed and discussed with patient prior to discharge.  Results for orders placed or performed in visit on 10/15/18   CBC with platelets   Result Value Ref Range    WBC 5.1 4.0 - 11.0 10e9/L    RBC Count 4.33 3.8 - 5.2 10e12/L    Hemoglobin 13.4 11.7 - 15.7 g/dL    Hematocrit 40.4 35.0 - 47.0 %    MCV 93 78 - 100 fl    MCH 30.9 26.5 - 33.0 pg    MCHC 33.2 31.5 - 36.5 g/dL    RDW 12.3 10.0 - 15.0 %    Platelet Count 240 150 - 450 10e9/L   UA reflex to Microscopic and Culture   Result Value Ref Range    Color Urine Yellow     Appearance Urine Clear     Glucose Urine Negative NEG^Negative mg/dL    Bilirubin Urine Negative NEG^Negative    Ketones Urine Negative NEG^Negative mg/dL    Specific Gravity Urine 1.010 1.003 - 1.035    Blood Urine Trace (A) NEG^Negative    pH Urine 7.0 5.0 - 7.0 pH    Protein Albumin Urine Negative NEG^Negative mg/dL    Urobilinogen Urine 0.2 0.2 - 1.0 EU/dL    Nitrite Urine Negative NEG^Negative    Leukocyte Esterase Urine Negative NEG^Negative    Source Midstream Urine    Urine Microscopic   Result Value Ref Range    WBC Urine 0 - 5 OTO5^0 - 5 /HPF    RBC Urine O - 2 OTO2^O - 2 /HPF    Squamous Epithelial /LPF Urine Few FEW^Few /LPF         ASSESSMENT/PLAN:     Tuyet was seen today for abdominal pain.    Diagnoses and all orders for this visit:    Abdominal pain, right lower quadrant; differentials to include appendicitis; ovarian pathology.   -     CBC with platelets  -     CRP  inflammation  -     UA reflex to Microscopic and Culture  -     Urine Microscopic  -     CT Abdomen Pelvis w Contrast; Future      Labs unremarkable so far.     Will notify her with CT results and next steps     Follow up if symptoms fail to improve or worsen.      The patient was in agreement with the plan today and had no questions or concerns prior to leaving the clinic.      Sandra Shaw MD  [facility]

## 2018-10-15 NOTE — MR AVS SNAPSHOT
After Visit Summary   10/15/2018    Tuyet Hernandez    MRN: [id number]           Patient Information     Date Of Birth          1960        Visit Information        Provider Department      10/15/2018 8:30 AM Sandra Shaw MD [facility] [place]        Today's Diagnoses     Abdominal pain, right lower quadrant    -  1      Care Instructions    Will notify you with results.           Follow-ups after your visit        Follow-up notes from your care team     Return if symptoms worsen or fail to improve.      Your next 10 appointments already scheduled     Oct 15, 2018 10:30 AM CDT   CT ABDOMEN PELVIS W CONTRAST with BECT1   [facility] [place] ([facility] [place])    [address]  [place] [address] [phone]           How do I prepare for my exam? (Food and drink instructions) To prepare: Do not eat or drink for 2 hours before your exam. If you need to take medicine, you may take it with small sips of water. (We may ask you to take liquid medicine as well.)  How do I prepare for my exam? (Other instructions) Please arrive 30 minutes early for your CT.  Once in the department you might be asked to drink water 15-20 minutes prior to your exam.  If indicated you may be asked to drink an oral contrast in advance of your CT.  If this is the case, the imaging team will let you know or be in contact with you prior to your appointment  Patients over 70 or patients with diabetes or kidney problems: If you haven t had a blood test (creatinine test) within the last 30 days, the Cardiologist/Radiologist may require you to get this test prior to your exam.  If you have diabetes:  Continue to take your metformin medication on the day of your exam  What should I wear: Please wear loose clothing, such as a sweat suit or jogging clothes. Avoid snaps, zippers and other metal. We may ask you to undress and put on a hospital gown.  How long does the exam take: Most scans take  less than 20 minutes.  What should I bring: Please bring any scans or X-rays taken at other hospitals, if similar tests were done. Also bring a list of your medicines, including vitamins, minerals and over-the-counter drugs. It is safest to leave personal items at home.  Do I need a : No  is needed.  What do I need to tell my doctor? Be sure to tell your doctor: * If you have any allergies. * If there s any chance you are pregnant. * If you are breastfeeding.  What should I do after the exam: No restrictions, You may resume normal activities.  What is this test: A CT (computed tomography) scan is a series of pictures that allows us to look inside your body. The scanner creates images of the body in cross sections, much like slices of bread. This helps us see any problems more clearly. You may receive contrast (X-ray dye) before or during your scan. You will be asked to drink the contrast.  Who should I call with questions: If you have any questions, please call the Imaging Department where you will have your exam. Directions, parking instructions, and other information is available on our website, [email or web address]/imaging.              Future tests that were ordered for you today     Open Future Orders        Priority Expected Expires Ordered    CT Abdomen Pelvis w Contrast Routine  10/15/2019 10/15/2018            Who to contact     Normal or non-critical lab and imaging results will be communicated to you by MyChart, letter or phone within 4 business days after the clinic has received the results. If you do not hear from us within 7 days, please contact the clinic through MyChart or phone. If you have a critical or abnormal lab result, we will notify you by phone as soon as possible.  Submit refill requests through [employer] or call your pharmacy and they will forward the refill request to us. Please allow 3 business days for your refill to be completed.          If you need to speak with a   for additional information , please call: [phone]             Additional Information About Your Visit        Care EveryWhere ID     This is your Care EveryWhere ID. This could be used by other organizations to access your [facility] medical records  [id number]        Your Vitals Were     Pulse Temperature Pulse Oximetry Breastfeeding? BMI (Body Mass Index)       65 98.2  F (36.8  C) (Tympanic) 100% No 23.51 kg/m2        Blood Pressure from Last 3 Encounters:   10/15/18 144/76   05/11/18 129/74   03/23/18 110/70    Weight from Last 3 Encounters:   10/15/18 154 lb 9.6 oz (70.1 kg)   05/11/18 149 lb (67.6 kg)   03/23/18 150 lb (68 kg)              We Performed the Following     CBC with platelets     CRP inflammation     UA reflex to Microscopic and Culture     Urine Microscopic        Primary Care Provider Office Phone # Fax #    Wes Lewis PA-C [phone]       [address] [facility] [address]        Equal Access to Services     [facility]: Hadii aad ku hadasho Soomaali, waaxda luqadaha, qaybta kaalmada adeegyada, waxay idiin hayaan brice [place] . So [facility] [phone].    ATENCIÓN: Si habla español, tiene a emery disposición servicios gratuitos de asistencia lingüística. [facility] [phone].    We comply with applicable federal civil rights laws and [place] laws. We do not discriminate on the basis of race, color, national origin, age, disability, sex, sexual orientation, or gender identity.            Thank you!     Thank you for choosing [facility]  for your care. Our goal is always to provide you with excellent care. Hearing back from our patients is one way we can continue to improve our services. Please take a few minutes to complete the written survey that you may receive in the mail after your visit with us. Thank you!             Your Updated Medication List - Protect others around you: Learn how to safely use, store and throw away your medicines at  www.disposemymeds.org.      Notice  As of 10/15/2018  9:27 AM    You have not been prescribed any medications.

## 2018-10-16 DIAGNOSIS — K59.00 CONSTIPATION, UNSPECIFIED CONSTIPATION TYPE: Primary | ICD-10-CM

## 2018-10-16 DIAGNOSIS — N83.202 LEFT OVARIAN CYST: ICD-10-CM

## 2018-10-16 RX ORDER — POLYETHYLENE GLYCOL 3350 17 G/17G
1 POWDER, FOR SOLUTION ORAL DAILY
Qty: 510 G | Refills: 0 | Status: SHIPPED | OUTPATIENT
Start: 2018-10-16 | End: 2019-09-10

## 2018-12-10 ENCOUNTER — TELEPHONE (OUTPATIENT)
Dept: FAMILY MEDICINE | Facility: CLINIC | Age: 58
End: 2018-12-10

## 2018-12-10 NOTE — TELEPHONE ENCOUNTER
Patient seeing Dianne for lower abd. Pain Tomorrow, Tuesday. [facility] states patient signed release already and that [facility] needs to call to get records faxed. Please ask for ultrasound report done on 12/6, Dr Narayan visit notes from 12/6 appt.and any test results, Dr Narayan's visit notes notes and test results from [facility] done on 11/16. The clinic medical records number is [phone]. Thank you.

## 2018-12-11 ENCOUNTER — OFFICE VISIT (OUTPATIENT)
Dept: FAMILY MEDICINE | Facility: CLINIC | Age: 58
End: 2018-12-11
Payer: COMMERCIAL

## 2018-12-11 VITALS
BODY MASS INDEX: 23.35 KG/M2 | WEIGHT: 153.6 LBS | RESPIRATION RATE: 20 BRPM | TEMPERATURE: 97.2 F | SYSTOLIC BLOOD PRESSURE: 119 MMHG | HEART RATE: 65 BPM | DIASTOLIC BLOOD PRESSURE: 79 MMHG | OXYGEN SATURATION: 100 %

## 2018-12-11 DIAGNOSIS — M54.41 ACUTE RIGHT-SIDED LOW BACK PAIN WITH RIGHT-SIDED SCIATICA: ICD-10-CM

## 2018-12-11 DIAGNOSIS — N83.202 CYSTS OF BOTH OVARIES: ICD-10-CM

## 2018-12-11 DIAGNOSIS — K59.00 CONSTIPATION, UNSPECIFIED CONSTIPATION TYPE: Primary | ICD-10-CM

## 2018-12-11 DIAGNOSIS — N83.201 CYSTS OF BOTH OVARIES: ICD-10-CM

## 2018-12-11 PROCEDURE — 99214 OFFICE O/P EST MOD 30 MIN: CPT | Performed by: PHYSICIAN ASSISTANT

## 2018-12-11 RX ORDER — PREDNISONE 20 MG/1
TABLET ORAL
Qty: 11 TABLET | Refills: 0 | Status: SHIPPED | OUTPATIENT
Start: 2018-12-11 | End: 2018-12-21

## 2018-12-11 NOTE — PATIENT INSTRUCTIONS
Please follow up with your next ultrasound as discussed by your OB/GYN provider, in 2019.    Purchase the followin - delayed release Dulcolax tablets  1 - 8.3 ounce bottle of Miralax  1 - 64 ounce bottle of Gatorade (not red)    Start your colon cleanse by taking your 4 tabs of Dulcolax.  Pour out ~8 ounces of the Gatorade to make room for the Miralax powder. Mix the Miralax powder into the Gatorade bottle. Drink a 6 ounce glass of this mixture every 30 minutes until the bottle is empty.    In addition, you should consume at least 1 gallon of clear liquids on the day of your cleanse.   You will usually have results in 4-6 hours so do not plan on going anywhere for 8 or more hours after starting the prep.    On day 2, if no result, please start the following prep:    Purchase the followin- bottle of Magnesium citrate  1- bottle of Milk of magnesia    Start by taking 1-2 table spoons of milk of magnesia.   2-3 hours later drink half of your bottle of magnesium citrate. I recommend drinking this cold. Another 2-3 hours after you drink the first half of the bottle drink the second half.  In addition, you should consume at least 1 gallon of clear liquids on the day of your cleanse.         Patient Education     Excess Gas  Certain foods produce gas when digested. In some people, these foods make an excessive amount of gas. This may cause bloating, burping, or increased gas passing through the rectum (flatulence).     Foods that cause gas  The following foods are more likely to cause this problem. Limit them, or remove them from your diet:    Broccoli    Cauliflower    [place] sprouts    Cabbage    Cooked dried beans    Fizzy (carbonated) drinks, such as sparkling water, soda, beer, and champagne  Other causes  Other causes of excess gas include:    Eating too fast or talking while you chew. This may cause you to swallow air. This increases the amount of gas in your stomach. And it may make your symptoms  worse. Chew each mouthful completely before you swallow. Take your time.    Chewing on gum or sucking on hard candy. These cause you to swallow more often. And some of what you are swallowing is air. This leads to more gas in your stomach. Avoid chewing gum and hard candy.    Overeating. This may increase the feeling of being bloated and cause more gas. When you are full, stop eating.     Being constipated. This can increase the amount of normal intestinal gas. Avoid constipation by getting more fiber in your diet. Good sources of fiber include whole-grain cereal, fresh vegetables (except those in the above list), and fresh fruits. High-fiber foods absorb water and carry it out of the body. When adding more fiber to your diet, you also need to drink more water. You should drink at least 8, 8-ounce glasses of water (2 quarts) per day.  Date Last Reviewed: 8/1/2016 2000-2018 [employer]. [address] [place] [address], [place] [address]. All rights reserved. This information is not intended as a substitute for professional medical care. Always follow your healthcare professional's instructions.

## 2018-12-11 NOTE — PROGRESS NOTES
SUBJECTIVE:   Tuyet Hernandez is a 57 year old female who presents to clinic today for the following health issues:      Abdominal Pain      Duration: [id number]    Description (location/character/radiation): lower abdominal pain mostly on the right side. Says the pain feels like menstrual cramps but is post menopausal for about 2 years and denies abnormal uterine bleeding.        Associated flank pain: None    Intensity:  Mild to moderate, occasionally keeps her awake at night. Yesterday was a lot worse. The cramping intensity varies considerably day to day.     Accompanying signs and symptoms:        Fever/Chills: no        Gas/Bloating: YES- bloating. Does pass occasional gas.        Nausea/vomitting: no        Diarrhea: no        Dysuria or Hematuria: no   Denies abnormal vaginal bleeding.       History (previous similar pain/trauma/previous testing):    Sciatic Nerve Pain - worse on right side, has had for years, can not recall imaging of her back and has not seen Physical therapy for this issue.     RLQ Pain - Had CT in October 2018 for this issue which showed constipation and right sided ovarian cyst    Bilateral ovarian cysts - Has had repeat ultrasounds done    12/06/2018 and 11/ /2018 with no changes between scans, appear consistent with small dermoid cysts. The remaining cysts are simple and hemorrhagic.Recommended recheck in 6 mos.      normal per patient.     Has had a previous ruptured ovarian cyst rupture approximately 10 years ago. When she went in October described similar pain. Has concerns about the nature of her pain, says her mother had ovarian cancer. Denies fevers, chills.    Colonoscopy 2014 with normal findings, recommended to repeat in 10  Years.               Precipitating or alleviating factors:       Pain worse with eating/BM/urination: No       Pain relieved by BM: YES- sometimes.       Therapies tried and outcome: OTC pain medication. Ibuprofen helps reduce the back and RLQ  pain.         LMP:  not applicable    Constipation:  Eating prunes, trying to increase fluids and fiber intake. Has bowel movement once a day. Episodes of constipation approximately once a week. Has not used the miralax.       PROBLEMS TO ADD ON...  None  -------------------------------------    Problem list and histories reviewed & adjusted, as indicated.  Additional history: as documented    Patient Active Problem List   Diagnosis     Abdominal pain, right lower quadrant     Constipation, unspecified constipation type     Left ovarian cyst     Past Surgical History:   Procedure Laterality Date     NO HISTORY OF SURGERY         Social History     Tobacco Use     Smoking status: Never Smoker     Smokeless tobacco: Never Used   Substance Use Topics     Alcohol use: No     No family history on file.        Reviewed and updated as needed this visit by clinical staff       Reviewed and updated as needed this visit by Provider         ROS:  Constitutional, MSK, gi and gu systems are negative, except as otherwise noted.    OBJECTIVE:                                                    /79   Pulse 65   Temp 97.2  F (36.2  C) (Tympanic)   Resp 20   Wt 69.7 kg (153 lb 9.6 oz)   SpO2 100%   BMI 23.35 kg/m     Body mass index is 23.35 kg/m .  GENERAL APPEARANCE: healthy, alert and no distress  ABDOMEN: soft, nontender, without hepatosplenomegaly or masses  MS: extremities normal- no gross deformities noted  ORTHO: Lumber/Thoracic Spine Exam: Tender:  right SI joint, right sciatic notch    PSYCH: mentation appears normal and affect normal/bright       ASSESSMENT:                                                      1. Constipation, unspecified constipation type    2. Acute right-sided low back pain with right-sided sciatica    3. Cysts of both ovaries         PLAN:                                                    Will try to clear out her bowels with a colon prep.   Patient referred for a second opinion with  GYN.  Prednisone B&T for sciatica symptoms. X-ray and physical therapy/chiro if no improvements.    Patient Instructions   Please follow up with your next ultrasound as discussed by your OB/GYN provider, in 2019.    Purchase the followin - delayed release Dulcolax tablets  1 - 8.3 ounce bottle of Miralax  1 - 64 ounce bottle of Gatorade (not red)    Start your colon cleanse by taking your 4 tabs of Dulcolax.  Pour out ~8 ounces of the Gatorade to make room for the Miralax powder. Mix the Miralax powder into the Gatorade bottle. Drink a 6 ounce glass of this mixture every 30 minutes until the bottle is empty.    In addition, you should consume at least 1 gallon of clear liquids on the day of your cleanse.   You will usually have results in 4-6 hours so do not plan on going anywhere for 8 or more hours after starting the prep.    On day 2, if no result, please start the following prep:    Purchase the followin- bottle of Magnesium citrate  1- bottle of Milk of magnesia    Start by taking 1-2 table spoons of milk of magnesia.   2-3 hours later drink half of your bottle of magnesium citrate. I recommend drinking this cold. Another 2-3 hours after you drink the first half of the bottle drink the second half.  In addition, you should consume at least 1 gallon of clear liquids on the day of your cleanse.         Patient Education     Excess Gas  Certain foods produce gas when digested. In some people, these foods make an excessive amount of gas. This may cause bloating, burping, or increased gas passing through the rectum (flatulence).     Foods that cause gas  The following foods are more likely to cause this problem. Limit them, or remove them from your diet:    Broccoli    Cauliflower    [place] sprouts    Cabbage    Cooked dried beans    Fizzy (carbonated) drinks, such as sparkling water, soda, beer, and champagne  Other causes  Other causes of excess gas include:    Eating too fast or talking while you  chew. This may cause you to swallow air. This increases the amount of gas in your stomach. And it may make your symptoms worse. Chew each mouthful completely before you swallow. Take your time.    Chewing on gum or sucking on hard candy. These cause you to swallow more often. And some of what you are swallowing is air. This leads to more gas in your stomach. Avoid chewing gum and hard candy.    Overeating. This may increase the feeling of being bloated and cause more gas. When you are full, stop eating.     Being constipated. This can increase the amount of normal intestinal gas. Avoid constipation by getting more fiber in your diet. Good sources of fiber include whole-grain cereal, fresh vegetables (except those in the above list), and fresh fruits. High-fiber foods absorb water and carry it out of the body. When adding more fiber to your diet, you also need to drink more water. You should drink at least 8, 8-ounce glasses of water (2 quarts) per day.  Date Last Reviewed: 8/1/2016 2000-2018 [employer]. [address] [place] [address], [place] [address]. All rights reserved. This information is not intended as a substitute for professional medical care. Always follow your healthcare professional's instructions.                Dianne Hughes PA-C  [facility]

## 2019-02-27 ENCOUNTER — TRANSFERRED RECORDS (OUTPATIENT)
Dept: HEALTH INFORMATION MANAGEMENT | Facility: CLINIC | Age: 59
End: 2019-02-27

## 2019-03-25 ENCOUNTER — TRANSFERRED RECORDS (OUTPATIENT)
Dept: HEALTH INFORMATION MANAGEMENT | Facility: CLINIC | Age: 59
End: 2019-03-25

## 2019-03-29 ENCOUNTER — TRANSFERRED RECORDS (OUTPATIENT)
Dept: HEALTH INFORMATION MANAGEMENT | Facility: CLINIC | Age: 59
End: 2019-03-29

## 2019-09-10 ENCOUNTER — OFFICE VISIT (OUTPATIENT)
Dept: FAMILY MEDICINE | Facility: CLINIC | Age: 59
End: 2019-09-10
Payer: COMMERCIAL

## 2019-09-10 VITALS
DIASTOLIC BLOOD PRESSURE: 74 MMHG | SYSTOLIC BLOOD PRESSURE: 113 MMHG | OXYGEN SATURATION: 100 % | WEIGHT: 149 LBS | TEMPERATURE: 97.6 F | RESPIRATION RATE: 18 BRPM | HEIGHT: 68 IN | BODY MASS INDEX: 22.58 KG/M2 | HEART RATE: 74 BPM

## 2019-09-10 DIAGNOSIS — N83.202 LEFT OVARIAN CYST: ICD-10-CM

## 2019-09-10 DIAGNOSIS — R10.31 ABDOMINAL PAIN, RIGHT LOWER QUADRANT: ICD-10-CM

## 2019-09-10 DIAGNOSIS — Z01.818 PREOP GENERAL PHYSICAL EXAM: Primary | ICD-10-CM

## 2019-09-10 LAB
BASOPHILS # BLD AUTO: 0 10E9/L (ref 0–0.2)
BASOPHILS NFR BLD AUTO: 0.5 %
DIFFERENTIAL METHOD BLD: NORMAL
EOSINOPHIL # BLD AUTO: 0.2 10E9/L (ref 0–0.7)
EOSINOPHIL NFR BLD AUTO: 3.6 %
ERYTHROCYTE [DISTWIDTH] IN BLOOD BY AUTOMATED COUNT: 12.4 % (ref 10–15)
HCG UR QL: NEGATIVE
HCT VFR BLD AUTO: 40.8 % (ref 35–47)
HGB BLD-MCNC: 13.5 G/DL (ref 11.7–15.7)
LYMPHOCYTES # BLD AUTO: 2.3 10E9/L (ref 0.8–5.3)
LYMPHOCYTES NFR BLD AUTO: 41.5 %
MCH RBC QN AUTO: 30.8 PG (ref 26.5–33)
MCHC RBC AUTO-ENTMCNC: 33.1 G/DL (ref 31.5–36.5)
MCV RBC AUTO: 93 FL (ref 78–100)
MONOCYTES # BLD AUTO: 0.4 10E9/L (ref 0–1.3)
MONOCYTES NFR BLD AUTO: 7.8 %
NEUTROPHILS # BLD AUTO: 2.6 10E9/L (ref 1.6–8.3)
NEUTROPHILS NFR BLD AUTO: 46.6 %
PLATELET # BLD AUTO: 229 10E9/L (ref 150–450)
RBC # BLD AUTO: 4.38 10E12/L (ref 3.8–5.2)
WBC # BLD AUTO: 5.5 10E9/L (ref 4–11)

## 2019-09-10 PROCEDURE — 85025 COMPLETE CBC W/AUTO DIFF WBC: CPT | Performed by: PHYSICIAN ASSISTANT

## 2019-09-10 PROCEDURE — 36415 COLL VENOUS BLD VENIPUNCTURE: CPT | Performed by: PHYSICIAN ASSISTANT

## 2019-09-10 PROCEDURE — 99214 OFFICE O/P EST MOD 30 MIN: CPT | Performed by: PHYSICIAN ASSISTANT

## 2019-09-10 PROCEDURE — 81025 URINE PREGNANCY TEST: CPT | Performed by: PHYSICIAN ASSISTANT

## 2019-09-10 PROCEDURE — 93000 ELECTROCARDIOGRAM COMPLETE: CPT | Performed by: PHYSICIAN ASSISTANT

## 2019-09-10 ASSESSMENT — MIFFLIN-ST. JEOR: SCORE: 1314.36

## 2019-09-10 NOTE — PROGRESS NOTES
[facility]  [address] [address]  [place] [address]  [phone]  Dept: [phone]    PRE-OP EVALUATION:  Today's date: 9/10/2019    Tuyet Hernandez (: 1960) presents for pre-operative evaluation assessment as requested by Dr. Jordy Lee.  She requires evaluation and anesthesia risk assessment prior to undergoing surgery/procedure for treatment of cysts of both ovaries and pelvic pain in female .    Proposed Surgery/ Procedure: robotic assisted laparoscopic total hysterectomy and bilateral salpingo oophorectomy  Date of Surgery/ Procedure: 19  Time of Surgery/ Procedure: [facility]/Surgical Facility: [facility]  Fax number for surgical facility: [phone]  Primary Physician: Wes Lewis  Type of Anesthesia Anticipated: General    Patient has a Health Care Directive or Living Will:  YES will bring a copy to the hospital    1. NO - Do you have a history of heart attack, stroke, stent, bypass or surgery on an artery in the head, neck, heart or legs?  2. NO - Do you ever have any pain or discomfort in your chest?  3. NO - Do you have a history of  Heart Failure?  4. NO - Are you troubled by shortness of breath when: walking on the level, up a slight hill or at night?  5. NO - Do you currently have a cold, bronchitis or other respiratory infection?  6. NO - Do you have a cough, shortness of breath or wheezing?  7. NO - Do you sometimes get pains in the calves of your legs when you walk?  8. NO - Do you or anyone in your family have previous history of blood clots?  9. NO - Do you or does anyone in your family have a serious bleeding problem such as prolonged bleeding following surgeries or cuts?  10. NO - Have you ever had problems with anemia or been told to take iron pills?  11. NO - Have you had any abnormal blood loss such as black, tarry or bloody stools, or abnormal vaginal bleeding?  12. NO - Have you ever had a blood transfusion?  13. NO - Have you or any of  "your relatives ever had problems with anesthesia?  14. NO - Do you have sleep apnea, excessive snoring or daytime drowsiness?  15. NO - Do you have any prosthetic heart valves?  16. NO - Do you have prosthetic joints?  17. NO - Is there any chance that you may be pregnant?      HPI:     HPI related to upcoming procedure: chronic pelvic pain. Ovarian cysts. fhx of ovarian cancer (mother).      See problem list for active medical problems.  Problems all longstanding and stable, except as noted/documented.  See ROS for pertinent symptoms related to these conditions.      MEDICAL HISTORY:     Patient Active Problem List    Diagnosis Date Noted     Constipation, unspecified constipation type 10/16/2018     Priority: Medium     Left ovarian cyst 10/16/2018     Priority: Medium     Abdominal pain, right lower quadrant 10/15/2018     Priority: Medium      Past Medical History:   Diagnosis Date     NO ACTIVE PROBLEMS      Past Surgical History:   Procedure Laterality Date     NO HISTORY OF SURGERY       No current outpatient medications on file.     OTC products: None, except as noted above    No Known Allergies   Latex Allergy: NO    Social History     Tobacco Use     Smoking status: Never Smoker     Smokeless tobacco: Never Used   Substance Use Topics     Alcohol use: No     History   Drug Use No       REVIEW OF SYSTEMS:   Constitutional, neuro, ENT, endocrine, pulmonary, cardiac, gastrointestinal, genitourinary, musculoskeletal, integument and psychiatric systems are negative, except as otherwise noted.    EXAM:   /74   Pulse 74   Temp 97.6  F (36.4  C) (Tympanic)   Resp 18   Ht 1.727 m (5' 8\")   Wt 67.6 kg (149 lb)   SpO2 100%   BMI 22.66 kg/m      GENERAL APPEARANCE: healthy, alert and no distress     EYES: EOMI, PERRL     HENT: ear canals and TM's normal and nose and mouth without ulcers or lesions     NECK: no adenopathy, no asymmetry, masses, or scars and thyroid normal to palpation     RESP: lungs clear " to auscultation - no rales, rhonchi or wheezes     CV: regular rates and rhythm, normal S1 S2, no S3 or S4 and no murmur, click or rub     ABDOMEN:  soft, nontender, no HSM or masses and bowel sounds normal     MS: extremities normal- no gross deformities noted, no evidence of inflammation in joints, FROM in all extremities.     SKIN: no suspicious lesions or rashes     NEURO: Normal strength and tone, sensory exam grossly normal, mentation intact and speech normal     PSYCH: mentation appears normal. and affect normal/bright     LYMPHATICS: No cervical adenopathy    DIAGNOSTICS:   EKG: appears normal, NSR, normal axis, normal intervals, no acute ST/T changes c/w ischemia, no LVH by voltage criteria, unchanged from previous tracings    Recent Labs   Lab Test 10/15/18  0849   HGB 13.4           IMPRESSION:       The proposed surgical procedure is considered INTERMEDIATE risk.    REVISED CARDIAC RISK INDEX  The patient has the following serious cardiovascular risks for perioperative complications such as (MI, PE, VFib and 3  AV Block):  No serious cardiac risks  INTERPRETATION: 0 risks: Class I (very low risk - 0.4% complication rate)    The patient has the following additional risks for perioperative complications:  No identified additional risks        RECOMMENDATIONS:         She is to hold aspirin/nsaids 7 days prior to surgery.  She takes no chronic/daily medications  APPROVAL GIVEN to proceed with proposed procedure, without further diagnostic evaluation       Signed Electronically by: Wes Lewis PA-C    Copy of this evaluation report is provided to requesting physician.    [facility] Preop Guidelines    Revised Cardiac Risk Index

## 2019-09-26 ENCOUNTER — TRANSFERRED RECORDS (OUTPATIENT)
Dept: HEALTH INFORMATION MANAGEMENT | Facility: CLINIC | Age: 59
End: 2019-09-26

## 2019-10-31 ENCOUNTER — TRANSFERRED RECORDS (OUTPATIENT)
Dept: HEALTH INFORMATION MANAGEMENT | Facility: CLINIC | Age: 59
End: 2019-10-31

## 2020-01-20 ENCOUNTER — OFFICE VISIT (OUTPATIENT)
Dept: FAMILY MEDICINE | Facility: CLINIC | Age: 60
End: 2020-01-20
Payer: COMMERCIAL

## 2020-01-20 VITALS
HEIGHT: 68 IN | WEIGHT: 151.8 LBS | SYSTOLIC BLOOD PRESSURE: 104 MMHG | BODY MASS INDEX: 23.01 KG/M2 | OXYGEN SATURATION: 99 % | RESPIRATION RATE: 16 BRPM | HEART RATE: 75 BPM | TEMPERATURE: 98.3 F | DIASTOLIC BLOOD PRESSURE: 76 MMHG

## 2020-01-20 DIAGNOSIS — K59.01 SLOW TRANSIT CONSTIPATION: Primary | ICD-10-CM

## 2020-01-20 PROCEDURE — 99213 OFFICE O/P EST LOW 20 MIN: CPT | Performed by: FAMILY MEDICINE

## 2020-01-20 RX ORDER — MULTIVITAMIN
1 TABLET ORAL
COMMUNITY
Start: 2010-07-01

## 2020-01-20 ASSESSMENT — MIFFLIN-ST. JEOR: SCORE: 1322.06

## 2020-01-20 NOTE — PROGRESS NOTES
"Subjective     Tuyet Hernandez is a 59 year old female who presents to clinic today for the following health issues:    HPI   Chief Complaint   Patient presents with     Constipation     Noticed constipation after starting to take calcium. Was given polyethylene glycol (MIRALAX) powder in the past     Constipation  Coincides with calcium supplement 3 weeks ago with daily vitamin, which she has stopped  Dulcolax 1 week ago which helped however has not been regular after that    Getting a lot of fiber  Drinking a lot of water  Exercising regularly  Still with infrequent stool and abdominal discomfort  CT abdomen showed large amount of stool  Normal colonoscopy 7yrs ago        BP Readings from Last 3 Encounters:   01/20/20 104/76   09/10/19 113/74   12/11/18 119/79    Wt Readings from Last 3 Encounters:   01/20/20 68.9 kg (151 lb 12.8 oz)   09/10/19 67.6 kg (149 lb)   12/11/18 69.7 kg (153 lb 9.6 oz)                      Reviewed and updated as needed this visit by Provider  Tobacco  Allergies  Meds  Problems  Med Hx  Surg Hx  Fam Hx         Review of Systems   ROS COMP: Constitutional, HEENT, cardiovascular, pulmonary, gi and gu systems are negative, except as otherwise noted.      Objective    /76   Pulse 75   Temp 98.3  F (36.8  C) (Oral)   Resp 16   Ht 1.727 m (5' 8\")   Wt 68.9 kg (151 lb 12.8 oz)   SpO2 99%   BMI 23.08 kg/m    Body mass index is 23.08 kg/m .  Physical Exam   GENERAL: healthy, alert and no distress  EYES: Eyes grossly normal to inspection, PERRL and conjunctivae and sclerae normal  NECK: no adenopathy, no asymmetry, masses, or scars and thyroid normal to palpation  ABDOMEN: soft, nontender, no hepatosplenomegaly, no masses and bowel sounds normal  SKIN: no suspicious lesions or rashes  PSYCH: mentation appears normal, affect normal/bright          Assessment & Plan       ICD-10-CM    1. Slow transit constipation K59.01             Patient Instructions     Tuyet     Here's the " plan:    Constipation - take 1-2 tabs of dulcolax before bedtime so you'll go to the bathroom in the morning.  Then take metamucil 2x per day for 3 days, then decrease to 1x per day for about 7 days.  You are doing great with your fiber intake.  Try eating more legumes like beans, lentils, and other healthy carbs such as quinoa or brown rice as these are packed with fiber.  If this doesn't work I would give milk of magnesia a try as this is a little stronger.  Let me know if you have questions.    Nash Painting MD         Patient Education   Coping with Constipation  What is constipation?  If you have hard, dry stools that are difficult to pass, you have constipation. You may also have bloating, gas and stomach cramps.  How is it treated?  If the problem is severe, your care team can suggest medicine to relieve it (a laxative, stool softener or enema). Do not use medicine unless your care team tells you to.  You should not use an enema (rectal wash) if your white blood cell or platelet count is low.  What else can I do to treat or prevent constipation?    Eat at the same times each day.    Add fiber to your diet by eating:  ? Whole grain breads, cereals and pastas  ? Whole grains such as barley or brown rice  ? Raw vegetables  ? Fresh and dried fruits  ? Dried beans and peas  ? Nuts, seeds and popcorn.    Drink lots of fluids: at least eight to ten 8-ounce glasses each day. Try water, prune juice, warm juices, herbal teas and lemonade.    Have a hot drink with high-fiber foods for breakfast.    Eat the skins on fruits and potatoes. Wash them well before eating.    Add wheat bran to cereals, casseroles and homemade breads.    If gas is a problem:  ? Avoid gas-forming foods such carbonated (fizzy) drinks, broccoli, cabbage, cauliflower, dried beans and peas, peppers and onions.  ? Do not use a straw.  ? Do not chew gum.    Stay active. Simply getting out for a walk can help. Increase your exercise as you are  able.    Try to use the toilet at the same times each day. Keep a record of your bowel movements.    If you take medicine that may cause constipation, your doctor may ask you to take a stool softener.  When should I call my care team?  Call your care team if:    You do not have a bowel movement for two or more days.    You have sudden, severe belly pain.    You notice blood in your stool.    You have severe hemorrhoids (swelling, itching and pain around the anus).  Comments:  __________________________________________  __________________________________________  __________________________________________  __________________________________________  __________________________________________  __________________________________________  __________________________________________  __________________________________________  __________________________________________  __________________________________________  For informational purposes only. Not to replace the advice of your health care provider. Copyright   2006 [employer]. All rights reserved. Clinically reviewed by [facility]. [email or web address] [id number] - REV 04/19.       Patient Education     Constipation (Adult)  Constipation means that you have bowel movements that are less frequent than usual. Stools often become very hard and difficult to pass.  Constipation is very common. At some point in life, it affects almost everyone. Since everyone's bowel habits are different, what is constipation to one person may not be to another. Your healthcare provider may do tests to diagnose constipation. It depends on what he or she finds when evaluating you.    Symptoms of constipation include:    Abdominal pain    Bloating    Vomiting    Painful bowel movements    Itching, swelling, bleeding, or pain around the anus  Causes  Constipation can have many causes. These include:    Diet low in fiber    Too much dairy    Not drinking enough liquids    Lack of exercise  or physical activity (especially true for older adults)    Changes in lifestyle or daily routine, including pregnancy, aging, work, and travel    Frequent use or misuse of laxatives    Ignoring the urge to have a bowel movement or delaying it until later    Medicines, such as certain prescription pain medicines, iron supplements, antacids, certain antidepressants, and calcium supplements    Diseases like irritable bowel syndrome, bowel obstructions, stroke, diabetes, thyroid disease, Parkinson disease, hemorrhoids, and colon cancer  Complications  Potential complications of constipation can include:    Hemorrhoids    Rectal bleeding from hemorrhoids or anal fissures (skin tears)    Hernias    Dependency on laxatives    Chronic constipation    Fecal impaction, a severe form of constipation in which a large amount of hard stool is in your rectum that you can't pass    Bowel obstruction or perforation  Home care  All treatment should be done after talking with your healthcare provider. This is especially true if you have another medical problems, are taking prescription medicines, or are an older adult. Treatment most often involves lifestyle changes. You may also need medicines. Your healthcare provider will tell you which will work best for you. Follow the advice below to help avoid this problem in the future.  Lifestyle changes  These lifestyle changes can help prevent constipation:    Diet. Eat a high-fiber diet, with fresh fruit and vegetables, and reduce dairy intake, meats, and processed foods    Fluids. It's important to get enough fluids each day. Drink plenty of water when you eat more fiber. If you are on diet that limits the amount of fluid you can have, talk about this with your healthcare provider.    Regular exercise. Check with your healthcare provider first.  Medicines  Take any medicines as directed. Some laxatives are safe to use only every now and then. Others can be taken on a regular basis. While  laxatives don't cause bowel dependence, they are treating the symptoms. So your constipation may return if you don't make other changes. Talk with your healthcare provider or pharmacist if you have questions.  Prescription pain medicines can cause constipation. If you are taking this kind of medicine, ask your healthcare provider if you should also take a stool softener.  Medicines you may take to treat constipation include:    Fiber supplements    Stool softeners    Laxatives    Enemas    Rectal suppositories  Follow-up care  Follow up with your healthcare provider if symptoms don't get better in the next few days. You may need to have more tests or see a specialist.  Call 911  Call 911 if any of these occur:    Trouble breathing    Stiff, rigid abdomen that is severely painful to touch    Confusion    Fainting or loss of consciousness    Rapid heart rate    Chest pain  When to seek medical advice  Call your healthcare provider right away if any of these occur:    Fever of 100.4 F (38 C) or higher, or as directed by your healthcare provider    Failure to resume normal bowel movements    Pain in your abdomen or back gets worse    Nausea or vomiting    Swelling in your abdomen    Blood in the stool    Black, tarry stool    Involuntary weight loss    Weakness  Date Last Reviewed: 6/1/2018 2000-2019 [employer]. [address] [place] [address]. All rights reserved. This information is not intended as a substitute for professional medical care. Always follow your healthcare professional's instructions.           Patient Education     Treating Constipation    Constipation is a common and often uncomfortable problem. Constipation means you have bowel movements fewer than 3 times per week, or strain to pass hard, dry stool. It can last a short time. Or it can be a problem that never seems to go away. The good news is that it can often be treated and controlled.  Eat more fiber  One of the best  ways to help treat constipation is to increase your fiber intake. You can do this either through diet or by using fiber supplements. Fiber (in whole grains, fruits, and vegetables) adds bulk and absorbs water to soften the stool. This helps the stool pass through the colon more easily. When you increase your fiber intake, do it slowly to avoid side effects such as bloating. Also increase the amount of water that you drink. Eating more of the following foods can add fiber to your diet.    High-fiber cereals    Whole grains, bran, and brown rice    Vegetables such as carrots, broccoli, and greens    Fresh fruits (especially apples, pears, and dried fruits like raisins and apricots)    Nuts and legumes (especially beans such as lentils, kidney beans, and lima beans)  Get physically active  Exercise helps improve the working of your colon which helps ease constipation. Try to get some physical activity every day. If you haven t been active for a while, talk to your healthcare provider before starting again.  Laxatives  Your healthcare provider may suggest an over-the-counter product to help ease your constipation. He or she may suggest the use of bulk-forming agents or laxatives. The use of laxatives, if used as directed, is common and safe. Follow directions carefully when using them. See your healthcare provider for new-onset constipation, or long-term constipation, to rule out other causes such as medicines or thyroid disease.  Date Last Reviewed: 7/1/2016 2000-2019 [employer]. [address] [place] [address], [place] [address]. All rights reserved. This information is not intended as a substitute for professional medical care. Always follow your healthcare professional's instructions.               Return in about 1 month (around 2/20/2020) for For Re-Assessment.    Nash Painting MD  [facility]

## 2020-01-20 NOTE — PATIENT INSTRUCTIONS
Tuyet     Here's the plan:    Constipation - take 1-2 tabs of dulcolax before bedtime so you'll go to the bathroom in the morning.  Then take metamucil 2x per day for 3 days, then decrease to 1x per day for about 7 days.  You are doing great with your fiber intake.  Try eating more legumes like beans, lentils, and other healthy carbs such as quinoa or brown rice as these are packed with fiber.  If this doesn't work I would give milk of magnesia a try as this is a little stronger.  Let me know if you have questions.    Nash Painting MD         Patient Education   Coping with Constipation  What is constipation?  If you have hard, dry stools that are difficult to pass, you have constipation. You may also have bloating, gas and stomach cramps.  How is it treated?  If the problem is severe, your care team can suggest medicine to relieve it (a laxative, stool softener or enema). Do not use medicine unless your care team tells you to.  You should not use an enema (rectal wash) if your white blood cell or platelet count is low.  What else can I do to treat or prevent constipation?    Eat at the same times each day.    Add fiber to your diet by eating:  ? Whole grain breads, cereals and pastas  ? Whole grains such as barley or brown rice  ? Raw vegetables  ? Fresh and dried fruits  ? Dried beans and peas  ? Nuts, seeds and popcorn.    Drink lots of fluids: at least eight to ten 8-ounce glasses each day. Try water, prune juice, warm juices, herbal teas and lemonade.    Have a hot drink with high-fiber foods for breakfast.    Eat the skins on fruits and potatoes. Wash them well before eating.    Add wheat bran to cereals, casseroles and homemade breads.    If gas is a problem:  ? Avoid gas-forming foods such carbonated (fizzy) drinks, broccoli, cabbage, cauliflower, dried beans and peas, peppers and onions.  ? Do not use a straw.  ? Do not chew gum.    Stay active. Simply getting out for a walk can help. Increase your  exercise as you are able.    Try to use the toilet at the same times each day. Keep a record of your bowel movements.    If you take medicine that may cause constipation, your doctor may ask you to take a stool softener.  When should I call my care team?  Call your care team if:    You do not have a bowel movement for two or more days.    You have sudden, severe belly pain.    You notice blood in your stool.    You have severe hemorrhoids (swelling, itching and pain around the anus).  Comments:  __________________________________________  __________________________________________  __________________________________________  __________________________________________  __________________________________________  __________________________________________  __________________________________________  __________________________________________  __________________________________________  __________________________________________  For informational purposes only. Not to replace the advice of your health care provider. Copyright   2006 [employer]. All rights reserved. Clinically reviewed by [facility]. [employer] [id number] - REV 04/19.       Patient Education     Constipation (Adult)  Constipation means that you have bowel movements that are less frequent than usual. Stools often become very hard and difficult to pass.  Constipation is very common. At some point in life, it affects almost everyone. Since everyone's bowel habits are different, what is constipation to one person may not be to another. Your healthcare provider may do tests to diagnose constipation. It depends on what he or she finds when evaluating you.    Symptoms of constipation include:    Abdominal pain    Bloating    Vomiting    Painful bowel movements    Itching, swelling, bleeding, or pain around the anus  Causes  Constipation can have many causes. These include:    Diet low in fiber    Too much dairy    Not drinking enough  liquids    Lack of exercise or physical activity (especially true for older adults)    Changes in lifestyle or daily routine, including pregnancy, aging, work, and travel    Frequent use or misuse of laxatives    Ignoring the urge to have a bowel movement or delaying it until later    Medicines, such as certain prescription pain medicines, iron supplements, antacids, certain antidepressants, and calcium supplements    Diseases like irritable bowel syndrome, bowel obstructions, stroke, diabetes, thyroid disease, Parkinson disease, hemorrhoids, and colon cancer  Complications  Potential complications of constipation can include:    Hemorrhoids    Rectal bleeding from hemorrhoids or anal fissures (skin tears)    Hernias    Dependency on laxatives    Chronic constipation    Fecal impaction, a severe form of constipation in which a large amount of hard stool is in your rectum that you can't pass    Bowel obstruction or perforation  Home care  All treatment should be done after talking with your healthcare provider. This is especially true if you have another medical problems, are taking prescription medicines, or are an older adult. Treatment most often involves lifestyle changes. You may also need medicines. Your healthcare provider will tell you which will work best for you. Follow the advice below to help avoid this problem in the future.  Lifestyle changes  These lifestyle changes can help prevent constipation:    Diet. Eat a high-fiber diet, with fresh fruit and vegetables, and reduce dairy intake, meats, and processed foods    Fluids. It's important to get enough fluids each day. Drink plenty of water when you eat more fiber. If you are on diet that limits the amount of fluid you can have, talk about this with your healthcare provider.    Regular exercise. Check with your healthcare provider first.  Medicines  Take any medicines as directed. Some laxatives are safe to use only every now and then. Others can be  taken on a regular basis. While laxatives don't cause bowel dependence, they are treating the symptoms. So your constipation may return if you don't make other changes. Talk with your healthcare provider or pharmacist if you have questions.  Prescription pain medicines can cause constipation. If you are taking this kind of medicine, ask your healthcare provider if you should also take a stool softener.  Medicines you may take to treat constipation include:    Fiber supplements    Stool softeners    Laxatives    Enemas    Rectal suppositories  Follow-up care  Follow up with your healthcare provider if symptoms don't get better in the next few days. You may need to have more tests or see a specialist.  Call 911  Call 911 if any of these occur:    Trouble breathing    Stiff, rigid abdomen that is severely painful to touch    Confusion    Fainting or loss of consciousness    Rapid heart rate    Chest pain  When to seek medical advice  Call your healthcare provider right away if any of these occur:    Fever of 100.4 F (38 C) or higher, or as directed by your healthcare provider    Failure to resume normal bowel movements    Pain in your abdomen or back gets worse    Nausea or vomiting    Swelling in your abdomen    Blood in the stool    Black, tarry stool    Involuntary weight loss    Weakness  Date Last Reviewed: 6/1/2018 2000-2019 [employer]. [address] [place] [address], [place] [address]. All rights reserved. This information is not intended as a substitute for professional medical care. Always follow your healthcare professional's instructions.           Patient Education     Treating Constipation    Constipation is a common and often uncomfortable problem. Constipation means you have bowel movements fewer than 3 times per week, or strain to pass hard, dry stool. It can last a short time. Or it can be a problem that never seems to go away. The good news is that it can often be treated and  controlled.  Eat more fiber  One of the best ways to help treat constipation is to increase your fiber intake. You can do this either through diet or by using fiber supplements. Fiber (in whole grains, fruits, and vegetables) adds bulk and absorbs water to soften the stool. This helps the stool pass through the colon more easily. When you increase your fiber intake, do it slowly to avoid side effects such as bloating. Also increase the amount of water that you drink. Eating more of the following foods can add fiber to your diet.    High-fiber cereals    Whole grains, bran, and brown rice    Vegetables such as carrots, broccoli, and greens    Fresh fruits (especially apples, pears, and dried fruits like raisins and apricots)    Nuts and legumes (especially beans such as lentils, kidney beans, and lima beans)  Get physically active  Exercise helps improve the working of your colon which helps ease constipation. Try to get some physical activity every day. If you haven t been active for a while, talk to your healthcare provider before starting again.  Laxatives  Your healthcare provider may suggest an over-the-counter product to help ease your constipation. He or she may suggest the use of bulk-forming agents or laxatives. The use of laxatives, if used as directed, is common and safe. Follow directions carefully when using them. See your healthcare provider for new-onset constipation, or long-term constipation, to rule out other causes such as medicines or thyroid disease.  Date Last Reviewed: 7/1/2016 2000-2019 [employer]. [address] [place] [address], [place] [id number]. All rights reserved. This information is not intended as a substitute for professional medical care. Always follow your healthcare professional's instructions.

## 2020-01-23 ENCOUNTER — OFFICE VISIT (OUTPATIENT)
Dept: FAMILY MEDICINE | Facility: CLINIC | Age: 60
End: 2020-01-23
Payer: COMMERCIAL

## 2020-01-23 VITALS
BODY MASS INDEX: 23.04 KG/M2 | RESPIRATION RATE: 16 BRPM | DIASTOLIC BLOOD PRESSURE: 76 MMHG | TEMPERATURE: 97.6 F | SYSTOLIC BLOOD PRESSURE: 104 MMHG | WEIGHT: 152 LBS | HEART RATE: 71 BPM | OXYGEN SATURATION: 100 % | HEIGHT: 68 IN

## 2020-01-23 DIAGNOSIS — K59.00 OBSTIPATION: Primary | ICD-10-CM

## 2020-01-23 PROCEDURE — 99214 OFFICE O/P EST MOD 30 MIN: CPT | Performed by: PHYSICIAN ASSISTANT

## 2020-01-23 RX ORDER — AMOXICILLIN 250 MG
1 CAPSULE ORAL 2 TIMES DAILY PRN
Qty: 12 TABLET | Refills: 0 | Status: SHIPPED | OUTPATIENT
Start: 2020-01-23 | End: 2020-03-03

## 2020-01-23 ASSESSMENT — MIFFLIN-ST. JEOR: SCORE: 1322.97

## 2020-01-23 NOTE — Clinical Note
Please abstract the following data from this visit with this patient into the appropriate field in Epic:Tests that can be patient reported without a hard copy:Other Tests found in the patient's chart through Chart Review/Care Everywhere:Pap smear done by this group April this date: 11/08/16Note to [employer]: If this section is blank, no results were found via Chart Review/Care Everywhere.

## 2020-01-23 NOTE — PROGRESS NOTES
"Subjective     Tuyet Hernandez is a 59 year old female who presents to clinic today for the following health issues:    HPI   Chief Complaint   Patient presents with     Constipation     Noticed constipation after starting to take calcium. Was given polyethylene glycol (MIRALAX) powder in the past and has not gone to the restroom since last office visit     Patient hasn't had a BM in over 5-6 days.  Did have liquid stool in early Jan but no firm BM and only small stools since then.  PMHx of constipation but had stopped Miralax not realizing that she would need it chronically.     Review of Systems   ROS COMP: Constitutional, HEENT, cardiovascular, pulmonary, gi and gu systems are negative, except as otherwise noted.      Objective    /76   Pulse 71   Temp 97.6  F (36.4  C) (Oral)   Resp 16   Ht 1.727 m (5' 8\")   Wt 68.9 kg (152 lb)   SpO2 100%   BMI 23.11 kg/m    Body mass index is 23.11 kg/m .     Physical Exam   GENERAL: healthy, alert and no distress  RESP: lungs clear to auscultation - no rales, rhonchi or wheezes  CV: regular rate and rhythm, normal S1 S2, no S3 or S4, no murmur, click or rub, no peripheral edema and peripheral pulses strong  ABDOMEN: tenderness RLQ and LLQ, no organomegaly or masses and bowel sounds normal    Diagnostic Test Results:  Labs reviewed in Epic        Assessment & Plan     1. Obstipation  - Bisacodyl (FLEET BISACODYL) 10 MG/30ML ENEM; Place 37 mLs (12.3 mg) rectally daily  Dispense: 2 Bottle; Refill: 0  - senna-docusate (SENOKOT-S/PERICOLACE) 8.6-50 MG tablet; Take 1 tablet by mouth 2 times daily as needed for constipation  Dispense: 12 tablet; Refill: 0     Use medication as directed.  Restart Daily Miralax  Follow up in ER if no BM in 48 hours      Return in about 1 week (around 1/30/2020) for follow up .    Rashad Castaneda PA-C  [facility]    "

## 2020-02-08 ENCOUNTER — HEALTH MAINTENANCE LETTER (OUTPATIENT)
Age: 60
End: 2020-02-08

## 2020-03-03 ENCOUNTER — OFFICE VISIT (OUTPATIENT)
Dept: FAMILY MEDICINE | Facility: CLINIC | Age: 60
End: 2020-03-03
Payer: COMMERCIAL

## 2020-03-03 VITALS
OXYGEN SATURATION: 98 % | HEART RATE: 71 BPM | RESPIRATION RATE: 16 BRPM | SYSTOLIC BLOOD PRESSURE: 117 MMHG | WEIGHT: 154.4 LBS | HEIGHT: 68 IN | TEMPERATURE: 98.2 F | DIASTOLIC BLOOD PRESSURE: 76 MMHG | BODY MASS INDEX: 23.4 KG/M2

## 2020-03-03 DIAGNOSIS — R19.4 CHANGE IN BOWEL HABITS: Primary | ICD-10-CM

## 2020-03-03 DIAGNOSIS — K82.4 GALLBLADDER POLYP: ICD-10-CM

## 2020-03-03 DIAGNOSIS — K59.00 CONSTIPATION, UNSPECIFIED CONSTIPATION TYPE: ICD-10-CM

## 2020-03-03 PROCEDURE — 36415 COLL VENOUS BLD VENIPUNCTURE: CPT | Performed by: FAMILY MEDICINE

## 2020-03-03 PROCEDURE — 84443 ASSAY THYROID STIM HORMONE: CPT | Performed by: FAMILY MEDICINE

## 2020-03-03 PROCEDURE — 99214 OFFICE O/P EST MOD 30 MIN: CPT | Performed by: FAMILY MEDICINE

## 2020-03-03 PROCEDURE — 83516 IMMUNOASSAY NONANTIBODY: CPT | Performed by: FAMILY MEDICINE

## 2020-03-03 ASSESSMENT — MIFFLIN-ST. JEOR: SCORE: 1333.85

## 2020-03-03 NOTE — PATIENT INSTRUCTIONS
Will notify you with results and next steps.   Schedule Ultrasound around 6/2020      Patient Education     Constipation (Adult)  Constipation means that you have bowel movements that are less frequent than usual. Stools often become very hard and difficult to pass.  Constipation is very common. At some point in life, it affects almost everyone. Since everyone's bowel habits are different, what is constipation to one person may not be to another. Your healthcare provider may do tests to diagnose constipation. It depends on what he or she finds when evaluating you.    Symptoms of constipation include:    Abdominal pain    Bloating    Vomiting    Painful bowel movements    Itching, swelling, bleeding, or pain around the anus  Causes  Constipation can have many causes. These include:    Diet low in fiber    Too much dairy    Not drinking enough liquids    Lack of exercise or physical activity (especially true for older adults)    Changes in lifestyle or daily routine, including pregnancy, aging, work, and travel    Frequent use or misuse of laxatives    Ignoring the urge to have a bowel movement or delaying it until later    Medicines, such as certain prescription pain medicines, iron supplements, antacids, certain antidepressants, and calcium supplements    Diseases like irritable bowel syndrome, bowel obstructions, stroke, diabetes, thyroid disease, Parkinson disease, hemorrhoids, and colon cancer  Complications  Potential complications of constipation can include:    Hemorrhoids    Rectal bleeding from hemorrhoids or anal fissures (skin tears)    Hernias    Dependency on laxatives    Chronic constipation    Fecal impaction, a severe form of constipation in which a large amount of hard stool is in your rectum that you can't pass    Bowel obstruction or perforation  Home care  All treatment should be done after talking with your healthcare provider. This is especially true if you have another medical problems, are  taking prescription medicines, or are an older adult. Treatment most often involves lifestyle changes. You may also need medicines. Your healthcare provider will tell you which will work best for you. Follow the advice below to help avoid this problem in the future.  Lifestyle changes  These lifestyle changes can help prevent constipation:    Diet. Eat a high-fiber diet, with fresh fruit and vegetables, and reduce dairy intake, meats, and processed foods    Fluids. It's important to get enough fluids each day. Drink plenty of water when you eat more fiber. If you are on diet that limits the amount of fluid you can have, talk about this with your healthcare provider.    Regular exercise. Check with your healthcare provider first.  Medicines  Take any medicines as directed. Some laxatives are safe to use only every now and then. Others can be taken on a regular basis. While laxatives don't cause bowel dependence, they are treating the symptoms. So your constipation may return if you don't make other changes. Talk with your healthcare provider or pharmacist if you have questions.  Prescription pain medicines can cause constipation. If you are taking this kind of medicine, ask your healthcare provider if you should also take a stool softener.  Medicines you may take to treat constipation include:    Fiber supplements    Stool softeners    Laxatives    Enemas    Rectal suppositories  Follow-up care  Follow up with your healthcare provider if symptoms don't get better in the next few days. You may need to have more tests or see a specialist.  Call 911  Call 911 if any of these occur:    Trouble breathing    Stiff, rigid abdomen that is severely painful to touch    Confusion    Fainting or loss of consciousness    Rapid heart rate    Chest pain  When to seek medical advice  Call your healthcare provider right away if any of these occur:    Fever of 100.4 F (38 C) or higher, or as directed by your healthcare  provider    Failure to resume normal bowel movements    Pain in your abdomen or back gets worse    Nausea or vomiting    Swelling in your abdomen    Blood in the stool    Black, tarry stool    Involuntary weight loss    Weakness  Date Last Reviewed: 6/1/2018 2000-2019 [employer]. [address] [place] [address], [place] [address]. All rights reserved. This information is not intended as a substitute for professional medical care. Always follow your healthcare professional's instructions.

## 2020-03-03 NOTE — PROGRESS NOTES
"Subjective     Tuyet Hernandez is a 59 year old female who presents to clinic today for the following health issues:    HPI   ED/UC Followup:    Facility:  [facility]  Date of visit: 1/31/20  Reason for visit: constipation  Current Status: Has been having this ongoing abdominal pain since initial OV 10/15/18. Right sided right lower abdominal pain has worsened over the past x2 months with bowel habit changes; constipation- prompting multiple office visits and recent ER visit.     Would like to discuss results from CT scan done 1/30/20 in comparison to scan done 10/15/2018     Recent ER records reviewed.   Patient diagnosed with Constipation- given instructions;  Drink 1 bottle magnesium citrate when you get home.  Restart miralax continue twice per day until adequate bowel movements. Then continue daily unless there is diarrhea.     Reports good results from the instructions above. States that her last BM was this morning    Recent CT showed some incidental findings of small benign appearing liver cysts and gallbladder polyp, largest measuring 6 mm. Recommended a follow up Ultrasound in 6 months    Previous Abdo CT/pelvis in 10/2018- retained colonic stool and 3.1 cm left ovarian cyst.   States that she has since had a Total abdominal hysterectomy with bilateral salpingoophorectomy.     States that her last colonoscopy was in 2018- reported \"normal\" results. No records available.     Patient Active Problem List   Diagnosis     Abdominal pain, right lower quadrant     Constipation, unspecified constipation type     Left ovarian cyst     Past Surgical History:   Procedure Laterality Date     HYSTERECTOMY TOTAL ABDOMINAL, BILATERAL SALPINGO-OOPHORECTOMY, COMBINED  09/2019       Social History     Tobacco Use     Smoking status: Never Smoker     Smokeless tobacco: Never Used   Substance Use Topics     Alcohol use: No     No family history on file.      Current Outpatient Medications   Medication Sig Dispense Refill     " "Multiple vitamin TABS Take 1 tablet by mouth       No Known Allergies      Reviewed and updated as needed this visit by Provider         Review of Systems   ROS COMP: Constitutional, HEENT, cardiovascular, pulmonary, gi and gu systems are negative, except as otherwise noted.      Objective    /76   Pulse 71   Temp 98.2  F (36.8  C) (Tympanic)   Resp 16   Ht 1.727 m (5' 8\")   Wt 70 kg (154 lb 6.4 oz)   SpO2 98%   Breastfeeding No   BMI 23.48 kg/m    Body mass index is 23.48 kg/m .  Physical Exam   GENERAL: healthy, alert and no distress  RESP: lungs clear to auscultation - no rales, rhonchi or wheezes  CV: regular rate and rhythm, normal S1 S2, no S3 or S4, no murmur, click or rub, no peripheral edema and peripheral pulses strong  ABDOMEN: soft, right lower quadrant tenderness, no hepatosplenomegaly, no masses and bowel sounds normal  PSYCH: mentation appears normal, affect normal/bright    Diagnostic Test Results:  Labs reviewed in Epic        Assessment & Plan     Tuyet was seen today for constipation.    Diagnoses and all orders for this visit:    Change in bowel habits  -     TSH with free T4 reflex  -     Tissue transglutaminase mau IgA and IgG  -     GASTROENTEROLOGY ADULT REF PROCEDURE ONLY- diagnostic colonoscopy  -     If labs and colonoscopy are normal will refer to GI for further evaluation.    Constipation, unspecified constipation type  -     TSH with free T4 reflex  -     Tissue transglutaminase mau IgA and IgG  -      In the interim recommended to increase fluid, fiber and exercise activity.  May take daily Metamucil.    Gallbladder polyp (incidental finding from recent CT scan)  -     US ABDOMEN COMPLETE; Future- around 6/2020      Will notify her with results and next steps.      Patient education and Handout with home care instructions given. See AVS for details.      Return in about 1 month (around 4/3/2020), or if symptoms worsen or fail to improve, for a Physical Exam at your " earliest convenience..    Sandra Shaw MD  [facility]

## 2020-03-04 LAB
TSH SERPL DL<=0.005 MIU/L-ACNC: 2.16 MU/L (ref 0.4–4)
TTG IGA SER-ACNC: 1 U/ML
TTG IGG SER-ACNC: 1 U/ML

## 2020-03-13 ENCOUNTER — TRANSFERRED RECORDS (OUTPATIENT)
Dept: HEALTH INFORMATION MANAGEMENT | Facility: CLINIC | Age: 60
End: 2020-03-13

## 2020-06-01 ENCOUNTER — TELEPHONE (OUTPATIENT)
Dept: FAMILY MEDICINE | Facility: CLINIC | Age: 60
End: 2020-06-01

## 2020-06-01 DIAGNOSIS — K82.4 GALLBLADDER POLYP: Primary | ICD-10-CM

## 2020-06-01 NOTE — TELEPHONE ENCOUNTER
Reason for Call: Request for an order or referral:    Order or referral being requested: order for Pelvic Ultrasound     Date needed: as soon as possible    Has the patient been seen by the PCP for this problem? YES    Additional comments: provider has entered a referral for an ultrasound but this needs to be an order.   Please enter this in Epic so patient can schedule this appointment.     Phone number Patient can be reached at:  Home number on file [phone] (home)    Best Time:  any    Can we leave a detailed message on this number?  YES    Call taken on 6/1/2020 at 9:48 AM by Yulia Siegel

## 2020-06-05 ENCOUNTER — ANCILLARY PROCEDURE (OUTPATIENT)
Dept: ULTRASOUND IMAGING | Facility: CLINIC | Age: 60
End: 2020-06-05
Attending: FAMILY MEDICINE
Payer: COMMERCIAL

## 2020-06-05 DIAGNOSIS — K82.4 GALLBLADDER POLYP: Primary | ICD-10-CM

## 2020-06-05 DIAGNOSIS — K82.4 GALL BLADDER POLYP: ICD-10-CM

## 2020-06-05 PROCEDURE — 76700 US EXAM ABDOM COMPLETE: CPT

## 2020-06-08 ENCOUNTER — OFFICE VISIT (OUTPATIENT)
Dept: SURGERY | Facility: CLINIC | Age: 60
End: 2020-06-08
Payer: COMMERCIAL

## 2020-06-08 VITALS
BODY MASS INDEX: 23.42 KG/M2 | HEART RATE: 77 BPM | DIASTOLIC BLOOD PRESSURE: 82 MMHG | WEIGHT: 154 LBS | SYSTOLIC BLOOD PRESSURE: 129 MMHG

## 2020-06-08 DIAGNOSIS — Z01.818 PRE-OPERATIVE EXAMINATION: Primary | ICD-10-CM

## 2020-06-08 DIAGNOSIS — K82.4 GALLBLADDER POLYP: ICD-10-CM

## 2020-06-08 PROCEDURE — 99204 OFFICE O/P NEW MOD 45 MIN: CPT | Performed by: SURGERY

## 2020-06-08 ASSESSMENT — PAIN SCALES - GENERAL: PAINLEVEL: NO PAIN (0)

## 2020-06-08 NOTE — PROGRESS NOTES
Assessment and Plan:  It is my impression that Tuyet has gallbladder polyps.   I have offered her a laparoscopic cholecystectomy.      We have discussed the indication, alternatives, risks and expected recovery.  Specifically we have discussed incisions, scarring, postoperative infections, anesthesia, bleeding, blood transfusion, open conversion, common bile duct injury, injury to intra-abdominal organs, adhesions that can lead to bowel obstruction, retained common bile duct stone, bile leak, DVT, PE, hernia, post cholecystectomy diarrhea, postoperative dietary restrictions and physical limitations.  We have discussed the recommended interventions and treatments for these complications.  All questions have been answered to the best of my ability.         We will schedule surgery at the patient's convenience.      Chief complaint:  Gallbladder polyps    HPI:  This patient is a 59 year old female who presents with gallbladder polyps (GBP) that were seen on CT and followed up with an US. One GBP was noted on CT about 8 months ago while repeat US showed 2. She is concerned that another grew in a short period of time. She denies any pain. She has no pain  associated with eating.   Negative for associated symptoms of nausea and vomiting.  She does not have a history of jaundice or dark urine.  She  has not had pancreatitis in the past.        Past Medical History:   has a past medical history of Gallbladder polyp.    Past Surgical History:  Past Surgical History:   Procedure Laterality Date     HYSTERECTOMY TOTAL ABDOMINAL, BILATERAL SALPINGO-OOPHORECTOMY, COMBINED  09/2019        Social History:  Social History     Socioeconomic History     Marital status:      Spouse name: Not on file     Number of children: Not on file     Years of education: Not on file     Highest education level: Not on file   Occupational History     Not on file   Social Needs     Financial resource strain: Not on file     Food insecurity      Worry: Not on file     Inability: Not on file     Transportation needs     Medical: Not on file     Non-medical: Not on file   Tobacco Use     Smoking status: Never Smoker     Smokeless tobacco: Never Used   Substance and Sexual Activity     Alcohol use: Yes     Comment: seldom     Drug use: No     Sexual activity: Yes     Partners: Male   Lifestyle     Physical activity     Days per week: Not on file     Minutes per session: Not on file     Stress: Not on file   Relationships     Social connections     Talks on phone: Not on file     Gets together: Not on file     Attends Gnosticist service: Not on file     Active member of club or organization: Not on file     Attends meetings of clubs or organizations: Not on file     Relationship status: Not on file     Intimate partner violence     Fear of current or ex partner: Not on file     Emotionally abused: Not on file     Physically abused: Not on file     Forced sexual activity: Not on file   Other Topics Concern     Parent/sibling w/ CABG, MI or angioplasty before 65F 55M? Not Asked   Social History Narrative     Not on file        Family History:  Family History   Problem Relation Age of Onset     Bleeding Disorder No family hx of      Anesthesia Reaction No family hx of      Gallbladder cancer: No    Review of Systems:  The 10 point review of systems is negative other than noted in the HPI and above.    Physical Exam:  /82   Pulse 77   Wt 69.9 kg (154 lb)   BMI 23.42 kg/m    Constitutional: healthy, alert and no distress  Eyes: Pupils round and equal, no icterus   ENT: Mucous membranes moist  Respiratory:  Non-labored respiration  Gastrointestinal: Abdomen soft, non-tender. BS normal. No masses, organomegaly  Musculoskeletal: No gross deformity  Neurologic: No gross focal deficits  Psychiatric: mentation appears normal and affect normal/bright  Hematologic/Lymphatic/Immunologic: No bruising noted  Skin: No lesions, rashes, erythema or jaundice  noted      Relevant labs:      WBC -   WBC   Date Value Ref Range Status   06/16/2020 4.9 4.0 - 11.0 10e9/L Final   ], HgB -   Hemoglobin   Date Value Ref Range Status   06/16/2020 13.4 11.7 - 15.7 g/dL Final       Imaging:    Recent Results (from the past 744 hour(s))   US Abdomen Complete    Narrative    ULTRASOUND ABDOMEN COMPLETE 6/5/2020 9:59 AM    CLINICAL HISTORY: Gallbladder polyp.    TECHNIQUE: Complete abdominal ultrasound.    COMPARISON: CT of the abdomen and pelvis performed 10/15/2018.    FINDINGS:    GALLBLADDER: There are two gallbladder polyps identified, with the  largest measuring 1 cm. These gallbladder polyps were not visible on  the prior CT scan. No shadowing gallstones are identified. No  gallbladder wall thickening or pericholecystic fluid. Negative  sonographic Loomis sign.    BILE DUCTS: No biliary dilatation. The common duct measures 4 mm.    LIVER: Normal parenchyma with smooth contour. No focal mass.    RIGHT KIDNEY: Normal size. Normal echogenicity with no hydronephrosis  or mass.     LEFT KIDNEY: Visualization of the left kidney is suboptimal due to  overlying bowel gas. The left kidney is unremarkable where seen, with  no evidence for hydronephrosis or mass.     SPLEEN: Normal.    PANCREAS: The visualized portions are normal.    AORTA: Normal in caliber.     IVC: Normal where visualized.    No ascites.      Impression    IMPRESSION:  1.  Two gallbladder polyps are identified, with the largest measuring  1 cm. Consider surgical consultation. At a minimum, a follow-up  ultrasound in 6 months would be recommended to ensure stability.  2.  Otherwise unremarkable abdominal ultrasound.    MD Mega REYNOSO,

## 2020-06-08 NOTE — LETTER
6/8/2020         RE: Tuyet Hernandez  [address] [facility] [address]        Dear Colleague,    Thank you for referring your patient, Tuyet Hernandez, to the [facility]. Please see a copy of my visit note below.    Assessment and Plan:  It is my impression that Tuyet has gallbladder polyps.   I have offered her a laparoscopic cholecystectomy.      We have discussed the indication, alternatives, risks and expected recovery.  Specifically we have discussed incisions, scarring, postoperative infections, anesthesia, bleeding, blood transfusion, open conversion, common bile duct injury, injury to intra-abdominal organs, adhesions that can lead to bowel obstruction, retained common bile duct stone, bile leak, DVT, PE, hernia, post cholecystectomy diarrhea, postoperative dietary restrictions and physical limitations.  We have discussed the recommended interventions and treatments for these complications.  All questions have been answered to the best of my ability.         We will schedule surgery at the patient's convenience.      Chief complaint:  Gallbladder polyps    HPI:  This patient is a 59 year old female who presents with gallbladder polyps (GBP) that were seen on CT and followed up with an US. One GBP was noted on CT about 8 months ago while repeat US showed 2. She is concerned that another grew in a short period of time. She denies any pain. She has no pain  associated with eating.   Negative for associated symptoms of nausea and vomiting.  She does not have a history of jaundice or dark urine.  She  has not had pancreatitis in the past.        Past Medical History:   has a past medical history of Gallbladder polyp.    Past Surgical History:  Past Surgical History:   Procedure Laterality Date     HYSTERECTOMY TOTAL ABDOMINAL, BILATERAL SALPINGO-OOPHORECTOMY, COMBINED  09/2019        Social History:  Social History     Socioeconomic History     Marital status:      Spouse name: Not  on file     Number of children: Not on file     Years of education: Not on file     Highest education level: Not on file   Occupational History     Not on file   Social Needs     Financial resource strain: Not on file     Food insecurity     Worry: Not on file     Inability: Not on file     Transportation needs     Medical: Not on file     Non-medical: Not on file   Tobacco Use     Smoking status: Never Smoker     Smokeless tobacco: Never Used   Substance and Sexual Activity     Alcohol use: Yes     Comment: seldom     Drug use: No     Sexual activity: Yes     Partners: Male   Lifestyle     Physical activity     Days per week: Not on file     Minutes per session: Not on file     Stress: Not on file   Relationships     Social connections     Talks on phone: Not on file     Gets together: Not on file     Attends Congregational service: Not on file     Active member of club or organization: Not on file     Attends meetings of clubs or organizations: Not on file     Relationship status: Not on file     Intimate partner violence     Fear of current or ex partner: Not on file     Emotionally abused: Not on file     Physically abused: Not on file     Forced sexual activity: Not on file   Other Topics Concern     Parent/sibling w/ CABG, MI or angioplasty before 65F 55M? Not Asked   Social History Narrative     Not on file        Family History:  Family History   Problem Relation Age of Onset     Bleeding Disorder No family hx of      Anesthesia Reaction No family hx of      Gallbladder cancer: No    Review of Systems:  The 10 point review of systems is negative other than noted in the HPI and above.    Physical Exam:  /82   Pulse 77   Wt 69.9 kg (154 lb)   BMI 23.42 kg/m    Constitutional: healthy, alert and no distress  Eyes: Pupils round and equal, no icterus   ENT: Mucous membranes moist  Respiratory:  Non-labored respiration  Gastrointestinal: Abdomen soft, non-tender. BS normal. No masses,  organomegaly  Musculoskeletal: No gross deformity  Neurologic: No gross focal deficits  Psychiatric: mentation appears normal and affect normal/bright  Hematologic/Lymphatic/Immunologic: No bruising noted  Skin: No lesions, rashes, erythema or jaundice noted      Relevant labs:      WBC -   WBC   Date Value Ref Range Status   06/16/2020 4.9 4.0 - 11.0 10e9/L Final   ], HgB -   Hemoglobin   Date Value Ref Range Status   06/16/2020 13.4 11.7 - 15.7 g/dL Final       Imaging:    Recent Results (from the past 744 hour(s))   US Abdomen Complete    Narrative    ULTRASOUND ABDOMEN COMPLETE 6/5/2020 9:59 AM    CLINICAL HISTORY: Gallbladder polyp.    TECHNIQUE: Complete abdominal ultrasound.    COMPARISON: CT of the abdomen and pelvis performed 10/15/2018.    FINDINGS:    GALLBLADDER: There are two gallbladder polyps identified, with the  largest measuring 1 cm. These gallbladder polyps were not visible on  the prior CT scan. No shadowing gallstones are identified. No  gallbladder wall thickening or pericholecystic fluid. Negative  sonographic Loomis sign.    BILE DUCTS: No biliary dilatation. The common duct measures 4 mm.    LIVER: Normal parenchyma with smooth contour. No focal mass.    RIGHT KIDNEY: Normal size. Normal echogenicity with no hydronephrosis  or mass.     LEFT KIDNEY: Visualization of the left kidney is suboptimal due to  overlying bowel gas. The left kidney is unremarkable where seen, with  no evidence for hydronephrosis or mass.     SPLEEN: Normal.    PANCREAS: The visualized portions are normal.    AORTA: Normal in caliber.     IVC: Normal where visualized.    No ascites.      Impression    IMPRESSION:  1.  Two gallbladder polyps are identified, with the largest measuring  1 cm. Consider surgical consultation. At a minimum, a follow-up  ultrasound in 6 months would be recommended to ensure stability.  2.  Otherwise unremarkable abdominal ultrasound.    MD Mega REYNOSO,  DO      Again, thank you for allowing me to participate in the care of your patient.        Sincerely,        Mega Iniguez, DO

## 2020-06-11 ENCOUNTER — MYC MEDICAL ADVICE (OUTPATIENT)
Dept: SURGERY | Facility: CLINIC | Age: 60
End: 2020-06-11

## 2020-06-11 ENCOUNTER — TELEPHONE (OUTPATIENT)
Dept: SURGERY | Facility: CLINIC | Age: 60
End: 2020-06-11

## 2020-06-11 PROBLEM — K82.4 GALLBLADDER POLYP: Status: ACTIVE | Noted: 2020-06-11

## 2020-06-11 NOTE — TELEPHONE ENCOUNTER
Orders were placed and surgery schedulers contacted patient.   Closing this encounter.    Rasheed Robison RN....6/11/2020 2:35 PM

## 2020-06-11 NOTE — TELEPHONE ENCOUNTER
Type of surgery: lap quentin CPT code 11693  Gallbladder polyp [K82.4]   Location of surgery: [facility]  Date and time of surgery: 6-19-20    Surgeon: Dr. Iniguez  Pre-Op Appt Date: TBD  Post-Op Appt Date: 7-2-20   Packet sent out: Yes  Pre-cert/Authorization completed: No prior auth per [employer] grid. [employer] [place] follows  guidelines. Reference number [id number]  Date: 06/11/2020    Insurance valid 06/11/2020

## 2020-06-11 NOTE — TELEPHONE ENCOUNTER
Reason for Call:  Other orders    Detailed comments: Patient was seen 6-8-20. Patient stated was to receive a call regarding scheduling surgery and is very concerned as she has not been contacted for this yet. Need orders.    Phone Number Patient can be reached at: Home number on file [phone] (home)    Best Time: any    Can we leave a detailed message on this number? YES    Call taken on 6/11/2020 at 11:57 AM by Martha Ralph

## 2020-06-11 NOTE — TELEPHONE ENCOUNTER
Reason for call:  Other   Patient called regarding (reason for call): call back  Additional comments: Patient is calling because she needs orders regarding a surgery and is upset she has not been contacted yet. Please call back.     Phone number to reach patient:  Home number on file [phone] (home)    Best Time:  any    Can we leave a detailed message on this number?  YES    Travel screening: Negative

## 2020-06-16 ENCOUNTER — OFFICE VISIT (OUTPATIENT)
Dept: FAMILY MEDICINE | Facility: CLINIC | Age: 60
End: 2020-06-16
Payer: COMMERCIAL

## 2020-06-16 VITALS
WEIGHT: 152 LBS | HEART RATE: 72 BPM | BODY MASS INDEX: 23.11 KG/M2 | SYSTOLIC BLOOD PRESSURE: 125 MMHG | OXYGEN SATURATION: 99 % | TEMPERATURE: 98.3 F | RESPIRATION RATE: 20 BRPM | DIASTOLIC BLOOD PRESSURE: 77 MMHG

## 2020-06-16 DIAGNOSIS — Z01.818 PREOP GENERAL PHYSICAL EXAM: Primary | ICD-10-CM

## 2020-06-16 DIAGNOSIS — K82.4 GALLBLADDER POLYP: ICD-10-CM

## 2020-06-16 DIAGNOSIS — Z01.818 PRE-OPERATIVE EXAMINATION: ICD-10-CM

## 2020-06-16 LAB
ANION GAP SERPL CALCULATED.3IONS-SCNC: 5 MMOL/L (ref 3–14)
BUN SERPL-MCNC: 16 MG/DL (ref 7–30)
CALCIUM SERPL-MCNC: 9 MG/DL (ref 8.5–10.1)
CHLORIDE SERPL-SCNC: 106 MMOL/L (ref 94–109)
CO2 SERPL-SCNC: 27 MMOL/L (ref 20–32)
CREAT SERPL-MCNC: 0.65 MG/DL (ref 0.52–1.04)
ERYTHROCYTE [DISTWIDTH] IN BLOOD BY AUTOMATED COUNT: 12.4 % (ref 10–15)
GFR SERPL CREATININE-BSD FRML MDRD: >90 ML/MIN/{1.73_M2}
GLUCOSE SERPL-MCNC: 114 MG/DL (ref 70–99)
HCT VFR BLD AUTO: 39.6 % (ref 35–47)
HGB BLD-MCNC: 13.4 G/DL (ref 11.7–15.7)
MCH RBC QN AUTO: 31.8 PG (ref 26.5–33)
MCHC RBC AUTO-ENTMCNC: 33.8 G/DL (ref 31.5–36.5)
MCV RBC AUTO: 94 FL (ref 78–100)
PLATELET # BLD AUTO: 236 10E9/L (ref 150–450)
POTASSIUM SERPL-SCNC: 3.9 MMOL/L (ref 3.4–5.3)
RBC # BLD AUTO: 4.21 10E12/L (ref 3.8–5.2)
SODIUM SERPL-SCNC: 138 MMOL/L (ref 133–144)
WBC # BLD AUTO: 4.9 10E9/L (ref 4–11)

## 2020-06-16 PROCEDURE — 36415 COLL VENOUS BLD VENIPUNCTURE: CPT | Performed by: FAMILY MEDICINE

## 2020-06-16 PROCEDURE — U0003 INFECTIOUS AGENT DETECTION BY NUCLEIC ACID (DNA OR RNA); SEVERE ACUTE RESPIRATORY SYNDROME CORONAVIRUS 2 (SARS-COV-2) (CORONAVIRUS DISEASE [COVID-19]), AMPLIFIED PROBE TECHNIQUE, MAKING USE OF HIGH THROUGHPUT TECHNOLOGIES AS DESCRIBED BY CMS-2020-01-R: HCPCS | Mod: 90 | Performed by: SURGERY

## 2020-06-16 PROCEDURE — 99000 SPECIMEN HANDLING OFFICE-LAB: CPT | Performed by: SURGERY

## 2020-06-16 PROCEDURE — 80048 BASIC METABOLIC PNL TOTAL CA: CPT | Performed by: FAMILY MEDICINE

## 2020-06-16 PROCEDURE — 85027 COMPLETE CBC AUTOMATED: CPT | Performed by: FAMILY MEDICINE

## 2020-06-16 PROCEDURE — 99214 OFFICE O/P EST MOD 30 MIN: CPT | Performed by: FAMILY MEDICINE

## 2020-06-16 NOTE — PROGRESS NOTES
[facility]  [address] [facility]  [place] [address]  [phone]  Dept: [phone]    PRE-OP EVALUATION:  Today's date: 2020    Tuyet Hernandez (: 1960) presents for pre-operative evaluation assessment as requested by Dr. Iniguez.  She requires evaluation and anesthesia risk assessment prior to undergoing surgery/procedure for treatment of gallbladder polyp .    Proposed Surgery/ Procedure: LAPAROSCOPIC CHOLECYSTECTOMY  Date of Surgery/ Procedure: 20  Time of Surgery/ Procedure: 1:30  Hospital/Surgical Facility: [facility]  Fax number for surgical facility:   Primary Physician: Sandra Shaw M.D  Type of Anesthesia Anticipated: General    Patient has a Health Care Directive or Living Will:  NO    1. NO - Do you have a history of heart attack, stroke, stent, bypass or surgery on an artery in the head, neck, heart or legs?  2. NO - Do you ever have any pain or discomfort in your chest?  3. NO - Do you have a history of  Heart Failure?  4. NO - Are you troubled by shortness of breath when: walking on the level, up a slight hill or at night?  5. NO - Do you currently have a cold, bronchitis or other respiratory infection?  6. NO - Do you have a cough, shortness of breath or wheezing?  7. NO - Do you sometimes get pains in the calves of your legs when you walk?  8. NO - Do you or anyone in your family have previous history of blood clots?  9. NO - Do you or does anyone in your family have a serious bleeding problem such as prolonged bleeding following surgeries or cuts?  10. NO - Have you ever had problems with anemia or been told to take iron pills?  11. NO - Have you had any abnormal blood loss such as black, tarry or bloody stools, or abnormal vaginal bleeding?  12. NO - Have you ever had a blood transfusion?  13. NO - Have you or any of your relatives ever had problems with anesthesia?  14. NO - Do you have sleep apnea, excessive snoring or daytime drowsiness?  15.  NO - Do you have any prosthetic heart valves?  16. NO - Do you have prosthetic joints?  17. NO - Is there any chance that you may be pregnant?      HPI:     HPI related to upcoming procedure:     59 year old pleasant female patient of mine here for a Pre-op exam.   She was recently had a follow up Abdominal Ultrasound and found to have Two gallbladder polyps, with the largest measuring 1 cm. Otherwise unremarkable abdominal ultrasound. See 6/5/2020 US report for details. Was seen and evaluated by Surgery and recommended to undergo a Lap Cholecystectomy.   States that she understands the risks and benefits of the procedure and wishes to proceed.     Otherwise healthy. Denies any recent illnesses.     See problem list for active medical problems.  Problems all longstanding and stable, except as noted/documented.  See ROS for pertinent symptoms related to these conditions.      MEDICAL HISTORY:     Patient Active Problem List    Diagnosis Date Noted     Gallbladder polyp 06/11/2020     Priority: Medium     Added automatically from request for surgery [id number]       Constipation, unspecified constipation type 10/16/2018     Priority: Medium     Left ovarian cyst 10/16/2018     Priority: Medium     Abdominal pain, right lower quadrant 10/15/2018     Priority: Medium      Past Medical History:   Diagnosis Date     Gallbladder polyp      Past Surgical History:   Procedure Laterality Date     HYSTERECTOMY TOTAL ABDOMINAL, BILATERAL SALPINGO-OOPHORECTOMY, COMBINED  09/2019     Current Outpatient Medications   Medication Sig Dispense Refill     Multiple vitamin TABS Take 1 tablet by mouth       OTC products: None, except as noted above    No Known Allergies   Latex Allergy: NO    Social History     Tobacco Use     Smoking status: Never Smoker     Smokeless tobacco: Never Used   Substance Use Topics     Alcohol use: Yes     Comment: seldom     History   Drug Use No       REVIEW OF SYSTEMS:   Constitutional, neuro, ENT,  endocrine, pulmonary, cardiac, gastrointestinal, genitourinary, musculoskeletal, integument and psychiatric systems are negative, except as otherwise noted.    EXAM:   /77   Pulse 72   Temp 98.3  F (36.8  C) (Tympanic)   Resp 20   Wt 68.9 kg (152 lb)   SpO2 99%   Breastfeeding No   BMI 23.11 kg/m      GENERAL APPEARANCE: healthy, alert and no distress     EYES: EOMI, PERRL     HENT: ear canals and TM's normal and nose and mouth without ulcers or lesions     NECK: no adenopathy, no asymmetry, masses, or scars and thyroid normal to palpation     RESP: lungs clear to auscultation - no rales, rhonchi or wheezes     CV: regular rates and rhythm, normal S1 S2, no S3 or S4 and no murmur, click or rub     ABDOMEN:  soft, nontender, no HSM or masses and bowel sounds normal     MS: extremities normal- no gross deformities noted, no evidence of inflammation in joints, FROM in all extremities.     SKIN: no suspicious lesions or rashes     NEURO: Normal strength and tone, sensory exam grossly normal, mentation intact and speech normal     PSYCH: mentation appears normal. and affect normal/bright     LYMPHATICS: No cervical adenopathy    DIAGNOSTICS:     Labs Drawn and in Process:   Unresulted Labs Ordered in the Past 30 Days of this Admission     No orders found from 5/17/2020 to 6/17/2020.          Recent Labs   Lab Test 09/10/19  0814 10/15/18  0849   HGB 13.5 13.4    240        IMPRESSION:   Reason for surgery/procedure:  LAPAROSCOPIC CHOLECYSTECTOMY  Diagnosis/reason for consult: Gallbladder polyps.     The proposed surgical procedure is considered INTERMEDIATE risk.    REVISED CARDIAC RISK INDEX  The patient has the following serious cardiovascular risks for perioperative complications such as (MI, PE, VFib and 3  AV Block):  No serious cardiac risks  INTERPRETATION: 0 risks: Class I (very low risk - 0.4% complication rate)    The patient has the following additional risks for perioperative  complications:  No identified additional risks      ICD-10-CM    1. Preop general physical exam  Z01.818 CBC with platelets     Basic metabolic panel   2. Gallbladder polyp  K82.4        RECOMMENDATIONS:       Cardiovascular Risk  None identified.       Pulmonary Risk  None identified.       --Patient is on no chronic medications    APPROVAL GIVEN to proceed with proposed procedure, without further diagnostic evaluation       Signed Electronically by: Sandra Shaw MD    Copy of this evaluation report is provided to requesting physician.    [facility] Preop Guidelines    Revised Cardiac Risk Index

## 2020-06-17 LAB
SARS-COV-2 RNA SPEC QL NAA+PROBE: NOT DETECTED
SPECIMEN SOURCE: NORMAL

## 2020-06-18 ENCOUNTER — ANESTHESIA EVENT (OUTPATIENT)
Dept: SURGERY | Facility: AMBULATORY SURGERY CENTER | Age: 60
End: 2020-06-18

## 2020-06-19 ENCOUNTER — HOSPITAL ENCOUNTER (OUTPATIENT)
Facility: AMBULATORY SURGERY CENTER | Age: 60
Discharge: HOME | End: 2020-06-19
Attending: SURGERY | Admitting: SURGERY
Payer: COMMERCIAL

## 2020-06-19 ENCOUNTER — ANESTHESIA (OUTPATIENT)
Dept: SURGERY | Facility: AMBULATORY SURGERY CENTER | Age: 60
End: 2020-06-19
Payer: COMMERCIAL

## 2020-06-19 VITALS
OXYGEN SATURATION: 100 % | RESPIRATION RATE: 12 BRPM | SYSTOLIC BLOOD PRESSURE: 116 MMHG | DIASTOLIC BLOOD PRESSURE: 61 MMHG | TEMPERATURE: 97.5 F

## 2020-06-19 DIAGNOSIS — K82.4 GALLBLADDER POLYP: ICD-10-CM

## 2020-06-19 PROCEDURE — 88304 TISSUE EXAM BY PATHOLOGIST: CPT | Performed by: SURGERY

## 2020-06-19 PROCEDURE — G8907 PT DOC NO EVENTS ON DISCHARG: HCPCS

## 2020-06-19 PROCEDURE — 47562 LAPAROSCOPIC CHOLECYSTECTOMY: CPT | Performed by: SURGERY

## 2020-06-19 PROCEDURE — G8916 PT W IV AB GIVEN ON TIME: HCPCS

## 2020-06-19 PROCEDURE — 47562 LAPAROSCOPIC CHOLECYSTECTOMY: CPT

## 2020-06-19 RX ORDER — ONDANSETRON 2 MG/ML
INJECTION INTRAMUSCULAR; INTRAVENOUS PRN
Status: DISCONTINUED | OUTPATIENT
Start: 2020-06-19 | End: 2020-06-19

## 2020-06-19 RX ORDER — AMOXICILLIN 250 MG
1-2 CAPSULE ORAL 2 TIMES DAILY
Qty: 30 TABLET | Refills: 0 | Status: SHIPPED | OUTPATIENT
Start: 2020-06-19 | End: 2022-10-12

## 2020-06-19 RX ORDER — CEFAZOLIN SODIUM 1 G/3ML
1 INJECTION, POWDER, FOR SOLUTION INTRAMUSCULAR; INTRAVENOUS SEE ADMIN INSTRUCTIONS
Status: DISCONTINUED | OUTPATIENT
Start: 2020-06-19 | End: 2020-06-20 | Stop reason: HOSPADM

## 2020-06-19 RX ORDER — NALOXONE HYDROCHLORIDE 0.4 MG/ML
.1-.4 INJECTION, SOLUTION INTRAMUSCULAR; INTRAVENOUS; SUBCUTANEOUS
Status: DISCONTINUED | OUTPATIENT
Start: 2020-06-19 | End: 2020-06-20 | Stop reason: HOSPADM

## 2020-06-19 RX ORDER — SODIUM CHLORIDE, SODIUM LACTATE, POTASSIUM CHLORIDE, CALCIUM CHLORIDE 600; 310; 30; 20 MG/100ML; MG/100ML; MG/100ML; MG/100ML
INJECTION, SOLUTION INTRAVENOUS CONTINUOUS
Status: DISCONTINUED | OUTPATIENT
Start: 2020-06-19 | End: 2020-06-20 | Stop reason: HOSPADM

## 2020-06-19 RX ORDER — OXYCODONE HYDROCHLORIDE 5 MG/1
5 TABLET ORAL EVERY 4 HOURS PRN
Status: DISCONTINUED | OUTPATIENT
Start: 2020-06-19 | End: 2020-06-20 | Stop reason: HOSPADM

## 2020-06-19 RX ORDER — DEXAMETHASONE SODIUM PHOSPHATE 4 MG/ML
INJECTION, SOLUTION INTRA-ARTICULAR; INTRALESIONAL; INTRAMUSCULAR; INTRAVENOUS; SOFT TISSUE PRN
Status: DISCONTINUED | OUTPATIENT
Start: 2020-06-19 | End: 2020-06-19

## 2020-06-19 RX ORDER — LIDOCAINE 40 MG/G
CREAM TOPICAL
Status: DISCONTINUED | OUTPATIENT
Start: 2020-06-19 | End: 2020-06-20 | Stop reason: HOSPADM

## 2020-06-19 RX ORDER — ONDANSETRON 4 MG/1
4 TABLET, ORALLY DISINTEGRATING ORAL EVERY 30 MIN PRN
Status: DISCONTINUED | OUTPATIENT
Start: 2020-06-19 | End: 2020-06-20 | Stop reason: HOSPADM

## 2020-06-19 RX ORDER — FENTANYL CITRATE 50 UG/ML
25-50 INJECTION, SOLUTION INTRAMUSCULAR; INTRAVENOUS
Status: DISCONTINUED | OUTPATIENT
Start: 2020-06-19 | End: 2020-06-20 | Stop reason: HOSPADM

## 2020-06-19 RX ORDER — OXYCODONE HYDROCHLORIDE 5 MG/1
5-10 TABLET ORAL EVERY 4 HOURS PRN
Qty: 12 TABLET | Refills: 0 | Status: SHIPPED | OUTPATIENT
Start: 2020-06-19 | End: 2022-10-12

## 2020-06-19 RX ORDER — GABAPENTIN 300 MG/1
300 CAPSULE ORAL ONCE
Status: COMPLETED | OUTPATIENT
Start: 2020-06-19 | End: 2020-06-19

## 2020-06-19 RX ORDER — HYDROMORPHONE HYDROCHLORIDE 1 MG/ML
.3-.5 INJECTION, SOLUTION INTRAMUSCULAR; INTRAVENOUS; SUBCUTANEOUS EVERY 10 MIN PRN
Status: DISCONTINUED | OUTPATIENT
Start: 2020-06-19 | End: 2020-06-20 | Stop reason: HOSPADM

## 2020-06-19 RX ORDER — PROPOFOL 10 MG/ML
INJECTION, EMULSION INTRAVENOUS CONTINUOUS PRN
Status: DISCONTINUED | OUTPATIENT
Start: 2020-06-19 | End: 2020-06-19

## 2020-06-19 RX ORDER — KETOROLAC TROMETHAMINE 30 MG/ML
INJECTION, SOLUTION INTRAMUSCULAR; INTRAVENOUS PRN
Status: DISCONTINUED | OUTPATIENT
Start: 2020-06-19 | End: 2020-06-19

## 2020-06-19 RX ORDER — BUPIVACAINE HYDROCHLORIDE AND EPINEPHRINE 2.5; 5 MG/ML; UG/ML
INJECTION, SOLUTION INFILTRATION; PERINEURAL PRN
Status: DISCONTINUED | OUTPATIENT
Start: 2020-06-19 | End: 2020-06-19 | Stop reason: HOSPADM

## 2020-06-19 RX ORDER — ACETAMINOPHEN 325 MG/1
650 TABLET ORAL EVERY 6 HOURS PRN
Qty: 50 TABLET | Refills: 0 | Status: SHIPPED | OUTPATIENT
Start: 2020-06-19 | End: 2022-10-12

## 2020-06-19 RX ORDER — ONDANSETRON 4 MG/1
4-8 TABLET, ORALLY DISINTEGRATING ORAL EVERY 8 HOURS PRN
Qty: 4 TABLET | Refills: 0 | Status: SHIPPED | OUTPATIENT
Start: 2020-06-19 | End: 2022-10-12

## 2020-06-19 RX ORDER — MEPERIDINE HYDROCHLORIDE 25 MG/ML
12.5 INJECTION INTRAMUSCULAR; INTRAVENOUS; SUBCUTANEOUS
Status: DISCONTINUED | OUTPATIENT
Start: 2020-06-19 | End: 2020-06-20 | Stop reason: HOSPADM

## 2020-06-19 RX ORDER — ONDANSETRON 2 MG/ML
4 INJECTION INTRAMUSCULAR; INTRAVENOUS EVERY 30 MIN PRN
Status: DISCONTINUED | OUTPATIENT
Start: 2020-06-19 | End: 2020-06-20 | Stop reason: HOSPADM

## 2020-06-19 RX ORDER — FENTANYL CITRATE 50 UG/ML
INJECTION, SOLUTION INTRAMUSCULAR; INTRAVENOUS PRN
Status: DISCONTINUED | OUTPATIENT
Start: 2020-06-19 | End: 2020-06-19

## 2020-06-19 RX ORDER — LIDOCAINE HYDROCHLORIDE 20 MG/ML
INJECTION, SOLUTION INFILTRATION; PERINEURAL PRN
Status: DISCONTINUED | OUTPATIENT
Start: 2020-06-19 | End: 2020-06-19

## 2020-06-19 RX ORDER — CEFAZOLIN SODIUM 2 G/100ML
2 INJECTION, SOLUTION INTRAVENOUS
Status: COMPLETED | OUTPATIENT
Start: 2020-06-19 | End: 2020-06-19

## 2020-06-19 RX ORDER — PROPOFOL 10 MG/ML
INJECTION, EMULSION INTRAVENOUS PRN
Status: DISCONTINUED | OUTPATIENT
Start: 2020-06-19 | End: 2020-06-19

## 2020-06-19 RX ORDER — ACETAMINOPHEN 325 MG/1
975 TABLET ORAL ONCE
Status: COMPLETED | OUTPATIENT
Start: 2020-06-19 | End: 2020-06-19

## 2020-06-19 RX ADMIN — ONDANSETRON 4 MG: 2 INJECTION INTRAMUSCULAR; INTRAVENOUS at 14:56

## 2020-06-19 RX ADMIN — OXYCODONE HYDROCHLORIDE 5 MG: 5 TABLET ORAL at 15:21

## 2020-06-19 RX ADMIN — FENTANYL CITRATE 25 MCG: 50 INJECTION, SOLUTION INTRAMUSCULAR; INTRAVENOUS at 15:20

## 2020-06-19 RX ADMIN — LIDOCAINE HYDROCHLORIDE 60 MG: 20 INJECTION, SOLUTION INFILTRATION; PERINEURAL at 13:52

## 2020-06-19 RX ADMIN — FENTANYL CITRATE 50 MCG: 50 INJECTION, SOLUTION INTRAMUSCULAR; INTRAVENOUS at 13:52

## 2020-06-19 RX ADMIN — GABAPENTIN 300 MG: 300 CAPSULE ORAL at 12:38

## 2020-06-19 RX ADMIN — CEFAZOLIN SODIUM 2 G: 2 INJECTION, SOLUTION INTRAVENOUS at 13:56

## 2020-06-19 RX ADMIN — SODIUM CHLORIDE, SODIUM LACTATE, POTASSIUM CHLORIDE, CALCIUM CHLORIDE: 600; 310; 30; 20 INJECTION, SOLUTION INTRAVENOUS at 13:49

## 2020-06-19 RX ADMIN — PROPOFOL 200 MCG/KG/MIN: 10 INJECTION, EMULSION INTRAVENOUS at 13:52

## 2020-06-19 RX ADMIN — SODIUM CHLORIDE, SODIUM LACTATE, POTASSIUM CHLORIDE, CALCIUM CHLORIDE: 600; 310; 30; 20 INJECTION, SOLUTION INTRAVENOUS at 12:44

## 2020-06-19 RX ADMIN — FENTANYL CITRATE: 50 INJECTION, SOLUTION INTRAMUSCULAR; INTRAVENOUS at 15:28

## 2020-06-19 RX ADMIN — ACETAMINOPHEN 975 MG: 325 TABLET ORAL at 12:38

## 2020-06-19 RX ADMIN — DEXAMETHASONE SODIUM PHOSPHATE 8 MG: 4 INJECTION, SOLUTION INTRA-ARTICULAR; INTRALESIONAL; INTRAMUSCULAR; INTRAVENOUS; SOFT TISSUE at 13:56

## 2020-06-19 RX ADMIN — ONDANSETRON 4 MG: 2 INJECTION INTRAMUSCULAR; INTRAVENOUS at 14:25

## 2020-06-19 RX ADMIN — PROPOFOL 200 MG: 10 INJECTION, EMULSION INTRAVENOUS at 13:52

## 2020-06-19 RX ADMIN — KETOROLAC TROMETHAMINE 30 MG: 30 INJECTION, SOLUTION INTRAMUSCULAR; INTRAVENOUS at 14:25

## 2020-06-19 RX ADMIN — Medication 40 MG: at 13:52

## 2020-06-19 NOTE — ANESTHESIA POSTPROCEDURE EVALUATION
Anesthesia POST Procedure Evaluation    Patient: Tuyet Hernandez   MRN:     [id number] Gender:   female   Age:    59 year old :      1960        Preoperative Diagnosis: Gallbladder polyp [K82.4]   Procedure(s):  LAPAROSCOPIC CHOLECYSTECTOMY   Postop Comments: No value filed.     Anesthesia Type: General          Postop Pain Control: Uneventful            Sign Out: Well controlled pain   PONV: No   Neuro/Psych: Uneventful            Sign Out: Acceptable/Baseline neuro status   Airway/Respiratory: Uneventful            Sign Out: Acceptable/Baseline resp. status   CV/Hemodynamics: Uneventful            Sign Out: Acceptable CV status   Other NRE: NONE   DID A NON-ROUTINE EVENT OCCUR? No         Last Anesthesia Record Vitals:  CRNA VITALS  2020 1421 - 2020 1521      2020             Pulse:  88    SpO2:  96 %          Last PACU Vitals:  Vitals Value Taken Time   /82 2020  3:20 PM   Temp 36.6  C (97.8  F) 2020  2:47 PM   Pulse 67 2020  3:20 PM   Resp 17 2020  3:22 PM   SpO2 100 % 2020  3:22 PM   Temp src     NIBP     Pulse     SpO2     Resp     Temp     Ht Rate     Temp 2     Vitals shown include unvalidated device data.      Electronically Signed By: Wilson Nolasco MD, 2020, 3:53 PM

## 2020-06-19 NOTE — ANESTHESIA PREPROCEDURE EVALUATION
"Anesthesia Pre-Procedure Evaluation    Patient: Tuyet Hernandez   MRN:     [id number] Gender:   female   Age:    59 year old :      1960        Preoperative Diagnosis: Gallbladder polyp [K82.4]   Procedure(s):  LAPAROSCOPIC CHOLECYSTECTOMY     LABS:  CBC:   Lab Results   Component Value Date    WBC 4.9 2020    WBC 5.5 09/10/2019    HGB 13.4 2020    HGB 13.5 09/10/2019    HCT 39.6 2020    HCT 40.8 09/10/2019     2020     09/10/2019     BMP:   Lab Results   Component Value Date     2020    POTASSIUM 3.9 2020    CHLORIDE 106 2020    CO2 27 2020    BUN 16 2020    CR 0.65 2020     (H) 2020     COAGS: No results found for: PTT, INR, FIBR  POC:   Lab Results   Component Value Date    HCG Negative 09/10/2019     OTHER:   Lab Results   Component Value Date    JACQUIE 9.0 2020    TSH 2.16 2020    CRP <2.9 10/15/2018        Preop Vitals    BP Readings from Last 3 Encounters:   20 111/66   20 125/77   20 129/82    Pulse Readings from Last 3 Encounters:   20 72   20 77   20 71      Resp Readings from Last 3 Encounters:   20 20   20 16   20 16    SpO2 Readings from Last 3 Encounters:   20 98%   20 99%   20 98%      Temp Readings from Last 1 Encounters:   20 36.6  C (97.9  F) (Temporal)    Ht Readings from Last 1 Encounters:   20 1.727 m (5' 8\")      Wt Readings from Last 1 Encounters:   20 68.9 kg (152 lb)    Estimated body mass index is 23.11 kg/m  as calculated from the following:    Height as of 3/3/20: 1.727 m (5' 8\").    Weight as of 20: 68.9 kg (152 lb).     LDA:  Peripheral IV 20 Left Wrist (Active)   Site Assessment WDL 20 1244   Line Status Infusing 20 1244   Number of days: 0        Past Medical History:   Diagnosis Date     Gallbladder polyp       Past Surgical History:   Procedure Laterality Date "     HYSTERECTOMY TOTAL ABDOMINAL, BILATERAL SALPINGO-OOPHORECTOMY, COMBINED  09/2019      No Known Allergies     Anesthesia Evaluation     .             ROS/MED HX    ENT/Pulmonary:  - neg pulmonary ROS     Neurologic:  - neg neurologic ROS     Cardiovascular:  - neg cardiovascular ROS       METS/Exercise Tolerance:     Hematologic:  - neg hematologic  ROS       Musculoskeletal:  - neg musculoskeletal ROS       GI/Hepatic:  - neg GI/hepatic ROS       Renal/Genitourinary:  - ROS Renal section negative       Endo:  - neg endo ROS       Psychiatric:  - neg psychiatric ROS       Infectious Disease:  - neg infectious disease ROS       Malignancy:      - no malignancy   Other:    - neg other ROS                     PHYSICAL EXAM:   Mental Status/Neuro: A/A/O   Airway: Facies: Feasible  Mallampati: I  Mouth/Opening: Full  TM distance: > 6 cm  Neck ROM: Full   Respiratory: Auscultation: CTAB     Resp. Rate: Normal     Resp. Effort: Normal      CV: Rhythm: Regular  Rate: Age appropriate  Heart: Normal Sounds  Edema: None   Comments:      Dental: Normal Dentition                Assessment:   ASA SCORE: 1    H&P: History and physical reviewed and following examination; no interval change.   Smoking Status:  Non-Smoker/Unknown   NPO Status: NPO Appropriate     Plan:   Anes. Type:  General   Pre-Medication: None   Induction:  IV (Standard)   Airway: ETT; Oral   Access/Monitoring: PIV   Maintenance: Balanced     Postop Plan:   Postop Pain: Opioids  Postop Sedation/Airway: Not planned  Disposition: Outpatient     PONV Management:   Adult Risk Factors: Female, Non-Smoker, Postop Opioids   Prevention: Ondansetron, Dexamethasone, Propofol     CONSENT: Direct conversation   Plan and risks discussed with: Patient   Blood Products: Consent Deferred (Minimal Blood Loss)                   Wilson Nolasco MD

## 2020-06-19 NOTE — ANESTHESIA CARE TRANSFER NOTE
Patient: Tuyet Hernandez    Procedure(s):  LAPAROSCOPIC CHOLECYSTECTOMY    Diagnosis: Gallbladder polyp [K82.4]  Diagnosis Additional Information: No value filed.    Anesthesia Type:   General     Note:  Anesthesia Care Transfer Notewriter    Vitals: (Last set prior to Anesthesia Care Transfer)    CRNA VITALS  6/19/2020 1420 - 6/19/2020 1450      6/19/2020             Pulse:  88    SpO2:  96 %          Awake, comfortable, sats 99%< Report to RN.      Electronically Signed By: KHADRA Gallagher CRNA  June 19, 2020  2:50 PM

## 2020-06-19 NOTE — DISCHARGE INSTRUCTIONS
[facility]  Same-Day Surgery   Adult Discharge Orders & Instructions   For 24 hours after surgery  1. Get plenty of rest.  A responsible adult must stay with you for at least 24 hours after you leave the hospital.   2. Do not drive or use heavy equipment.  If you have weakness or tingling, don't drive or use heavy equipment until this feeling goes away.  3. Do not drink alcohol.  4. Avoid strenuous or risky activities.  Ask for help when climbing stairs.   5. You may feel lightheaded.  IF so, sit for a few minutes before standing.  Have someone help you get up.   6. If you have nausea (feel sick to your stomach): Drink only clear liquids such as apple juice, ginger ale, broth or 7-Up.  Rest may also help.  Be sure to drink enough fluids.  Move to a regular diet as you feel able.  7. You may have a slight fever. Call the doctor if your fever is over 100 F (37.7 C) (taken under the tongue) or lasts longer than 24 hours.  8. You may have a dry mouth, a sore throat, muscle aches or trouble sleeping.  These should go away after 24 hours.  9. Do not make important or legal decisions.   Call your doctor for any of the followin.  Signs of infection (fever, growing tenderness at the surgery site, a large amount of drainage or bleeding, severe pain, foul-smelling drainage, redness, swelling).    2. It has been over 8 to 10 hours since surgery and you are still not able to urinate (pass water).    3.  Headache for over 24 hours.       To contact Dr Iniguez call [phone].    Tylenol was given at 12;45 AM,. No Tylenol before 7 PM    No Ibuprofen before 8:30 PM        Managing Your Pain   Pain management is an important part of your care. When you are in pain or uncomfortable, it can affect the way you feel both physically and emotionally.   The longer pain goes untreated, the harder it is to relieve. Effective pain management can break the pain cycle.   When you take care of your pain before it  becomes a problem you will:     Heal faster     Be more comfortable when walking and doing breathing exercises     Regain your strength faster     Other Ways to Manage Pain   There are many ways besides medication to treat your pain. Ask your nurse or doctor for more information about:     Relaxation techniques     Guided imagery     Breathing exercises     Hot or cold packs     Massage     Changing position (elevation or support)     Using pillows or splints to protect incisions when coughing, laughing, etc.     Music     The goal is for you to be able to complete activities such as turning in bed, walking and doing deep breathing exercises with only mild to moderate pain.   Possible Side Effects of Pain Medications     Constipation     Sleepiness     Dry mouth     Nausea and/or vomiting   It is important for you to let your nurse or doctor know if you have any of these side effects.     What You Can Do to Help with the Side Effects     Drink as much fluid as possible     Eat foods high in fiber (beans, lentils, fruits)     Ask for medication if you continue to have problems with constipation     Suck on sugarless hard candy, or ice     Take pain medications with food     Peppermint can be helpful to decrease nausea     Managing Your Pain at Home   Your doctor may give you a prescription for pain medicine to take at home. Most pain medications to be taken at home are in pill form.   Your nurse will review the instructions for taking your pain medications. When taken by mouth, medication can take up to 30 minutes to be effective. Remember to take pain medication when your pain first begins      Remember, same day surgery does not mean same day recovery.  Healing is a gradual process.  It is normal to be impatient and feel discouraged while waiting for swelling, bruising, discomfort and numbness to diminish.  Allow yourself to be a patient!  Extra rest, a nutritious diet, and avoidance of stress are important aids to  recovery.          [facility]   Adult Discharge Orders & Instructions     For 24 hours after surgery    1. Get plenty of rest.  A responsible adult must stay with you for at least 24 hours after you leave the hospital.   2. Do not drive or use heavy equipment.  If you have weakness or tingling, don't drive or use heavy equipment until this feeling goes away.  3. Do not drink alcohol.  4. Avoid strenuous or risky activities.  Ask for help when climbing stairs.   5. You may feel lightheaded.  IF so, sit for a few minutes before standing.  Have someone help you get up.   6. If you have nausea (feel sick to your stomach): Drink only clear liquids such as apple juice, ginger ale, broth or 7-Up.  Rest may also help.  Be sure to drink enough fluids.  Move to a regular diet as you feel able.  7. You may have a slight fever. Call the doctor if your fever is over 100 F (37.7 C) (taken under the tongue) or lasts longer than 24 hours.  8. You may have a dry mouth, a sore throat, muscle aches or trouble sleeping.  These should go away after 24 hours.  9. Do not make important or legal decisions.     Call your doctor for any of the followin.  Signs of infection (fever, growing tenderness at the surgery site, a large amount of drainage or bleeding, severe pain, foul-smelling drainage, redness, swelling).    2. It has been over 8 to 10 hours since surgery and you are still not able to urinate (pass water).    3.  Headache for over 24 hours.    4.  Numbness, tingling or weakness the day after surgery (if you had spinal anesthesia).                  5. Signs of Covid-19 infection (temperature over 100 degrees, shortness of breath, cough, loss of taste/smell, generalized body aches, persistent headache,                  chills, sore throat, nausea/vomiting/diarrhea).    To contact a doctor, call ________________________________________    ankle pump exercises: This particular exercise is important because it  helps decrease the swelling in the knee and lower leg.  It s also very important in helping you avoid developing blood clots in your lower leg(s) after surgery.   To do an ankle pump you point and flex your foot back and forth.  You should do 10 repetitions several times during the day. You really can t over do these.    Deep breathing and coughing:  It's important to learn deep breathing and coughing exercises as these will help to lower your risk of lung complications after your surgery.  Breathing deeply:  Moves air down to the bottom areas of the lungs   Opens air passages and moves mucous out (coughing is also easier)   Helps the blood and oxygen supply to your lungs, boosting circulation   Lowers the risk of lung complications such as pneumonia and infections  Breathe in deeply and slowly through your nose, expanding your lower rib cage, and letting your abdomen move forward. Hold for a count of 3 to 5. Breathe out slowly and completely.  Don't force your breath out. On the third breath, cough deeply from the lungs, not the throat.  Rest and repeat every hour while you are awake.

## 2020-06-19 NOTE — OP NOTE
General Surgery Operative Note    Pre-operative diagnosis: gallbladder polyps   Post-operative diagnosis: same   Procedure: laparoscopic cholecystectomy   Surgeon: Mega Iniguez DO   Assistant(s): NONE   Anesthesia: general   Estimated blood loss:  Specimen: 5 cc  gallbladder and contents               DESCRIPTION OF PROCEDURE:  The patient was taken to the operating room and placed on the table in supine position.  General endotracheal anesthesia was induced and the abdomen was prepped and draped in standard sterile fashion.  An incision above the umbilicus was made with an 11 blade.  A 5mm visiport was placed under direct visualization in the usual manner and the abdomen was insufflated with CO2.  A 5 mm trocar was placed in the subxiphoid position.  A 5 mm trocar was placed in the right upper quadrant, just below the costal margin at the midclavicular line.  Another 5mm port was placed at the anterior axillary line just below the costal margin on the right.  The patient was placed in reverse Trendelenburg and right side up.  The gallbladder appeared normal with mild light adhesions of the omentum.  The fundus of the gallbladder was grasped and retracted cephalad.  The infundibulum was grasped and retracted laterally.  The peritoneum over the medial and lateral aspects of the triangle of Calot was taken down with the [place] dissector and modest amounts of Bovie electrocautery.  The cystic duct and artery were freed up from surrounding tissues.  The triangle of Calot was skeletonized revealing the critical view of safety.  This revealed no additional ducts or vessels.  The cystic artery and duct were each clipped twice proximally, once distally and transected with the hook scissors.  The gallbladder was then removed from the liver using the hook electrocautery. A small perforation into the gallbladder was made due to the thin wall of the gallbladder. The fluid was suctioned out.  The gallbladder was passed  into an Endocatch bag and removed through the epigastric trocar site.  We observed the right upper quadrant carefully for hemostasis.  Hemostasis was assured.  We irrigated with copious amounts of sterile saline and aspirated the effluent until clear.  The right upper quadrant trocar sites were anesthetized with local anesthetic.  Each of the trocars was removed under direct visualization and the  abdomen was evacuated of CO2. There was no bleeding from any of these sites.  All of the incisions were closed with interrupted 3-0 Vicryl deep subcuticular sutures. Dermabond and small imbedded Steri-Strips were used to approximate the epidermis.  The patient tolerated the procedure well.  Sponge and instrument counts were correct.    Mega Iniguez DO

## 2020-06-23 LAB — COPATH REPORT: NORMAL

## 2020-07-02 ENCOUNTER — OFFICE VISIT (OUTPATIENT)
Dept: SURGERY | Facility: CLINIC | Age: 60
End: 2020-07-02
Payer: COMMERCIAL

## 2020-07-02 VITALS — DIASTOLIC BLOOD PRESSURE: 79 MMHG | HEART RATE: 93 BPM | SYSTOLIC BLOOD PRESSURE: 121 MMHG

## 2020-07-02 DIAGNOSIS — K21.00 GASTROESOPHAGEAL REFLUX DISEASE WITH ESOPHAGITIS: Primary | ICD-10-CM

## 2020-07-02 PROCEDURE — 99024 POSTOP FOLLOW-UP VISIT: CPT | Performed by: SURGERY

## 2020-07-02 NOTE — LETTER
7/2/2020         RE: Tuyet Hernandez  [id number] [facility] [address]        Dear Colleague,    Thank you for referring your patient, Tuyet Hernandez, to the [facility]. Please see a copy of my visit note below.    General Surgery Post Op    Pt returns for follow up visit s/p cholecystectomy.    Patient is very upset that she had surgery as the pathology afterwards showed a 4 mm polyp whereas the pre-operative US showed a 1 cm polyp and a 4 mm polyp. She was very concerned since there was a second polyp that she had cancer pre-operatively. She reports that although she is happy that she does not have cancer she is very unhappy that she had surgery. She is reporting loose stool and is not tolerating fatty foods yet. We discussed at length the pre-operative work-up and also how she felt about the whole experience. Her pain is controlled. No issues with wound healing/redness/drainage. No fevers.    Physical exam: Vitals: /79   Pulse 93   BMI= There is no height or weight on file to calculate BMI.    Exam:  Constitutional: healthy, alert and no distress  Respiratory: Non-labored  Gastrointestinal: Abdomen soft, non-tender. BS normal. 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     Plan: Pt doing well and can follow up as needed. I explained to her that the loose stool usually resolves but that if it has not or starts to worsen then she should follow-up with me and we will further discuss her symptoms and also further treatment if needed.      Mega Iniguez, DO        Again, thank you for allowing me to participate in the care of your patient.        Sincerely,        Mega Iniguez, DO

## 2020-07-02 NOTE — PROGRESS NOTES
General Surgery Post Op    Pt returns for follow up visit s/p cholecystectomy.    Patient is very upset that she had surgery as the pathology afterwards showed a 4 mm polyp whereas the pre-operative US showed a 1 cm polyp and a 4 mm polyp. She was very concerned since there was a second polyp that she had cancer pre-operatively. She reports that although she is happy that she does not have cancer she is very unhappy that she had surgery. She is reporting loose stool and is not tolerating fatty foods yet. We discussed at length the pre-operative work-up and also how she felt about the whole experience. Her pain is controlled. No issues with wound healing/redness/drainage. No fevers.    Physical exam: Vitals: /79   Pulse 93   BMI= There is no height or weight on file to calculate BMI.    Exam:  Constitutional: healthy, alert and no distress  Respiratory: Non-labored  Gastrointestinal: Abdomen soft, non-tender. BS normal. 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     Plan: Pt doing well and can follow up as needed. I explained to her that the loose stool usually resolves but that if it has not or starts to worsen then she should follow-up with me and we will further discuss her symptoms and also further treatment if needed.      Mega Iniguez, DO

## 2020-11-07 ENCOUNTER — HEALTH MAINTENANCE LETTER (OUTPATIENT)
Age: 60
End: 2020-11-07

## 2021-03-27 ENCOUNTER — HEALTH MAINTENANCE LETTER (OUTPATIENT)
Age: 61
End: 2021-03-27

## 2021-06-23 ENCOUNTER — TRANSFERRED RECORDS (OUTPATIENT)
Dept: HEALTH INFORMATION MANAGEMENT | Facility: CLINIC | Age: 61
End: 2021-06-23

## 2021-08-17 ENCOUNTER — TRANSFERRED RECORDS (OUTPATIENT)
Dept: HEALTH INFORMATION MANAGEMENT | Facility: CLINIC | Age: 61
End: 2021-08-17

## 2021-09-05 ENCOUNTER — HEALTH MAINTENANCE LETTER (OUTPATIENT)
Age: 61
End: 2021-09-05

## 2022-02-20 ENCOUNTER — HEALTH MAINTENANCE LETTER (OUTPATIENT)
Age: 62
End: 2022-02-20

## 2022-04-17 ENCOUNTER — HEALTH MAINTENANCE LETTER (OUTPATIENT)
Age: 62
End: 2022-04-17

## 2022-05-31 ENCOUNTER — TRANSFERRED RECORDS (OUTPATIENT)
Dept: HEALTH INFORMATION MANAGEMENT | Facility: CLINIC | Age: 62
End: 2022-05-31
Payer: COMMERCIAL

## 2022-07-12 ENCOUNTER — TRANSFERRED RECORDS (OUTPATIENT)
Dept: FAMILY MEDICINE | Facility: CLINIC | Age: 62
End: 2022-07-12

## 2022-10-12 ENCOUNTER — VIRTUAL VISIT (OUTPATIENT)
Dept: FAMILY MEDICINE | Facility: CLINIC | Age: 62
End: 2022-10-12
Payer: COMMERCIAL

## 2022-10-12 DIAGNOSIS — J01.01 ACUTE RECURRENT MAXILLARY SINUSITIS: Primary | ICD-10-CM

## 2022-10-12 PROCEDURE — 99213 OFFICE O/P EST LOW 20 MIN: CPT | Mod: GT | Performed by: PHYSICIAN ASSISTANT

## 2022-10-12 RX ORDER — AMOXICILLIN 875 MG
875 TABLET ORAL 2 TIMES DAILY
Qty: 20 TABLET | Refills: 0 | Status: SHIPPED | OUTPATIENT
Start: 2022-10-12 | End: 2022-11-30

## 2022-10-12 NOTE — PROGRESS NOTES
"Tuyet is a 61 year old who is being evaluated via a billable video visit.      How would you like to obtain your AVS? MyChart  If the video visit is dropped, the invitation should be resent by: Text to cell phone: [phone]  Will anyone else be joining your video visit? No          Subjective   Tuyet is a 61 year old presenting for the following health issues:  Sinus Problem and Pharyngitis        History of Present Illness       Reason for visit:  Sinus, sore throat cough  Symptom onset:  3-7 days ago  Symptoms include:  Sore throat and coughing from draining  Symptom intensity:  Severe  Symptom progression:  Staying the same  Had these symptoms before:  Yes  Has tried/received treatment for these symptoms:  No  What makes it worse:  Just constant symptoms  What makes it better:  Advil helps the sore throat    She eats 4 or more servings of fruits and vegetables daily.She consumes 0 sweetened beverage(s) daily.She exercises with enough effort to increase her heart rate 30 to 60 minutes per day.  She exercises with enough effort to increase her heart rate 5 days per week.   She is taking medications regularly.       Has chronic sinus problem her \"whole life\" but worse for the past 7 days  Has nasal drainage and is coughing due to drainage  She has some sinus pain on the R  Ears feel fine  No sneezing or itchy/watery eyes  No fever, chills or body aches  Nasal congestion and drainage with bending over. Drainage has been orange and \"nasty\"  Her  had sore throat last week and he had a virtual visit and he did get better with prednisone and amoxicillin quickly  She has had 2 negative covid tests this week  Seems worse since having covid in [place]  Feels sick every 5 weeks with sinus congestion, drainage and sore throat  She takes Mucinex D and flonase and that helps  She has had to covid vaccines ([facility])      Review of Systems         Objective           Vitals:  No vitals were obtained today due to virtual " visit.    Physical Exam   GENERAL: Healthy, alert and no distress  EYES: Eyes grossly normal to inspection.  No discharge or erythema, or obvious scleral/conjunctival abnormalities.  RESP: No audible wheeze, cough, or visible cyanosis.  No visible retractions or increased work of breathing.    SKIN: Visible skin clear. No significant rash, abnormal pigmentation or lesions.  NEURO: Cranial nerves grossly intact.  Mentation and speech appropriate for age.  PSYCH: Mentation appears normal, affect normal/bright, judgement and insight intact, normal speech and appearance well-groomed.          Assessment and Plan:     (J01.01) Acute recurrent maxillary sinusitis  (primary encounter diagnosis)  Comment:   Plan: amoxicillin (AMOXIL) 875 MG tablet  Bid x 10d.              Leslie Mcdaniel PA-C      Video-Visit Details    Video Start Time: 12:34 PM    Type of service:  Video Visit    Video End Time:12:46 PM    Originating Location (pt. Location): Home    Distant Location (provider location):  [facility]     Platform used for Video Visit: Shemar

## 2022-10-23 ENCOUNTER — HEALTH MAINTENANCE LETTER (OUTPATIENT)
Age: 62
End: 2022-10-23

## 2022-11-30 ENCOUNTER — VIRTUAL VISIT (OUTPATIENT)
Dept: INTERNAL MEDICINE | Facility: CLINIC | Age: 62
End: 2022-11-30
Payer: COMMERCIAL

## 2022-11-30 DIAGNOSIS — R51.9 FACIAL PAIN: Primary | ICD-10-CM

## 2022-11-30 DIAGNOSIS — J01.90 ACUTE SINUSITIS WITH SYMPTOMS > 10 DAYS: ICD-10-CM

## 2022-11-30 PROCEDURE — 99214 OFFICE O/P EST MOD 30 MIN: CPT | Mod: GT | Performed by: INTERNAL MEDICINE

## 2022-11-30 NOTE — PROGRESS NOTES
Tuyte is a 61 year old who is being evaluated via a billable video visit.      How would you like to obtain your AVS? MyChart  If the video visit is dropped, the invitation should be resent by: Text to cell phone: [phone]  Will anyone else be joining your video visit? No  1. Facial pain  2. Acute sinusitis with symptoms > 10 days  Patient with recent sinus infection treated with amoxicillin now with exquisite tenderness of the bridge of her nose.  Only bothers her when she wears her glasses or touches her nose but it is quite intense and has been going on for over a week.  She does have some nasal and sinus congestion ongoing not as bad as it was when she received your previous antibiotic.  She has no skin rash or other evidence of shingles.  No trauma.  I recommended she take naproxen 440 mg twice a day for 1 week along with Augmentin for 1 week.  If not improving she should come in for an examination.  This could be done with her primary physician or with an ENT specialist.  - amoxicillin-clavulanate (AUGMENTIN) 875-125 MG tablet; Take 1 tablet by mouth 2 times daily for 7 days  Dispense: 14 tablet; Refill: 0    Subjective   Tuyet is a 61 year old, presenting for the following health issues:  Facial Pain (Pain at the bridge of her nose x 1 week. Tender to the touch- no known injury, did finish amoxicillin at the end of oct for a sinus infection and sx had completely gone away)    History of Present Illness       Reason for visit:  The bridge of my nose is extremely painful  Symptom onset:  3-7 days ago  Symptoms include:  Pressure between my eyes; sneezing; a little stuffy  Symptom intensity:  Severe  Symptom progression:  Staying the same  Had these symptoms before:  No  What makes it worse:  Pressing on or moving my nose, wearing glasses, bending over  What makes it better:  Not touching my nose, not wearing glasses    She eats 4 or more servings of fruits and vegetables daily.She consumes 0 sweetened  beverage(s) daily.She exercises with enough effort to increase her heart rate 30 to 60 minutes per day.  She exercises with enough effort to increase her heart rate 4 days per week.   She is taking medications regularly.     Review of Systems       Objective         Vitals:  No vitals were obtained today due to virtual visit.    Physical Exam   No rash seen on her nose or face, no obvious deformity of her nasal structures        Video-Visit Details    Video Start Time: 12:15 PM    Type of service:  Video Visit    Video End Time:12:29 PM    Originating Location (pt. Location): Home    istant Location (provider location):  On-site    Platform used for Video Visit: Namrata

## 2023-04-02 ENCOUNTER — HEALTH MAINTENANCE LETTER (OUTPATIENT)
Age: 63
End: 2023-04-02

## 2023-06-18 ENCOUNTER — TRANSFERRED RECORDS (OUTPATIENT)
Dept: HEALTH INFORMATION MANAGEMENT | Facility: CLINIC | Age: 63
End: 2023-06-18
Payer: COMMERCIAL

## 2023-06-23 ENCOUNTER — TRANSFERRED RECORDS (OUTPATIENT)
Dept: HEALTH INFORMATION MANAGEMENT | Facility: CLINIC | Age: 63
End: 2023-06-23
Payer: COMMERCIAL

## 2023-07-27 ENCOUNTER — TRANSFERRED RECORDS (OUTPATIENT)
Dept: HEALTH INFORMATION MANAGEMENT | Facility: CLINIC | Age: 63
End: 2023-07-27
Payer: COMMERCIAL

## 2023-10-05 ENCOUNTER — TRANSFERRED RECORDS (OUTPATIENT)
Dept: HEALTH INFORMATION MANAGEMENT | Facility: CLINIC | Age: 63
End: 2023-10-05
Payer: COMMERCIAL

## 2024-10-29 ENCOUNTER — OFFICE VISIT (OUTPATIENT)
Dept: FAMILY MEDICINE | Facility: CLINIC | Age: 64
End: 2024-10-29
Payer: COMMERCIAL

## 2024-10-29 VITALS
DIASTOLIC BLOOD PRESSURE: 76 MMHG | BODY MASS INDEX: 22.52 KG/M2 | HEIGHT: 68 IN | RESPIRATION RATE: 16 BRPM | WEIGHT: 148.6 LBS | HEART RATE: 92 BPM | SYSTOLIC BLOOD PRESSURE: 118 MMHG | TEMPERATURE: 98.8 F

## 2024-10-29 DIAGNOSIS — H57.9 EYE PRESSURE: ICD-10-CM

## 2024-10-29 DIAGNOSIS — M54.9 UPPER BACK PAIN: ICD-10-CM

## 2024-10-29 DIAGNOSIS — J34.89 SINUS PRESSURE: Primary | ICD-10-CM

## 2024-10-29 DIAGNOSIS — M43.6 NECK STIFFNESS: ICD-10-CM

## 2024-10-29 PROCEDURE — 99213 OFFICE O/P EST LOW 20 MIN: CPT | Performed by: NURSE PRACTITIONER

## 2024-10-29 RX ORDER — PREDNISONE 20 MG/1
40 TABLET ORAL DAILY
Qty: 10 TABLET | Refills: 0 | Status: SHIPPED | OUTPATIENT
Start: 2024-10-29 | End: 2024-11-03

## 2024-10-29 RX ORDER — FLUTICASONE PROPIONATE 50 MCG
1 SPRAY, SUSPENSION (ML) NASAL DAILY
Qty: 16 G | Refills: 2 | Status: SHIPPED | OUTPATIENT
Start: 2024-10-29

## 2024-10-29 RX ORDER — TIZANIDINE 2 MG/1
2 TABLET ORAL 3 TIMES DAILY
Qty: 20 TABLET | Refills: 0 | Status: SHIPPED | OUTPATIENT
Start: 2024-10-29

## 2024-10-29 ASSESSMENT — ENCOUNTER SYMPTOMS: HEADACHES: 1

## 2024-10-29 ASSESSMENT — PATIENT HEALTH QUESTIONNAIRE - PHQ9
SUM OF ALL RESPONSES TO PHQ QUESTIONS 1-9: 0
SUM OF ALL RESPONSES TO PHQ QUESTIONS 1-9: 0
10. IF YOU CHECKED OFF ANY PROBLEMS, HOW DIFFICULT HAVE THESE PROBLEMS MADE IT FOR YOU TO DO YOUR WORK, TAKE CARE OF THINGS AT HOME, OR GET ALONG WITH OTHER PEOPLE: NOT DIFFICULT AT ALL

## 2024-10-29 ASSESSMENT — PAIN SCALES - GENERAL: PAINLEVEL_OUTOF10: MODERATE PAIN (5)

## 2024-10-29 NOTE — PATIENT INSTRUCTIONS
Sinus pressure:   Use flonase daily until symptoms improve.   Take prednisone as prescribed. It can increase hunger, thirst, urination, and irritability while on it. Take in the morning to prevent insomnia.   If symptoms do not improve by Monday, please send me a message and we'll try an antibiotic. If symptoms worsen over the weekend, please go to UC or ER.     Neck and back pain:   Use muscle relaxer as prescribed. Do not take prior to driving as it can impair your driving.

## 2024-10-29 NOTE — PROGRESS NOTES
Assessment & Plan     Sinus pressure  Bilat TM bulging on exam with R>L. Symptoms are likely due to sinus pressure. Low suspicion for emergent condition that would need further evaluation of eye pressure. Recommended flonase and prednisone for the next week. If symptoms do not improve by Monday, advised she mychart message me and I'll send an antibiotic since it will have been 2 weeks by then. If symptoms worsen over the weekend, go to  or ER.     - fluticasone (FLONASE) 50 MCG/ACT nasal spray; Spray 1 spray into both nostrils daily.  - predniSONE (DELTASONE) 20 MG tablet; Take 2 tablets (40 mg) by mouth daily for 5 days.    Eye pressure  See above.     - fluticasone (FLONASE) 50 MCG/ACT nasal spray; Spray 1 spray into both nostrils daily.  - predniSONE (DELTASONE) 20 MG tablet; Take 2 tablets (40 mg) by mouth daily for 5 days.    Neck stiffness  Likely 2/2 facial pain and tensing up over the week/weekend due to pain with moving eye so she felt she had to move her whole head whenever she needed to look at anything. Tizanidine prescribed since it is less drowsy and she could potentially still work with it. Advised against driving after taking it. Side effects, risks and benefits of medication were discussed with patient. Discussed how and when to take medication.     - tiZANidine (ZANAFLEX) 2 MG tablet; Take 1 tablet (2 mg) by mouth 3 times daily.    Upper back pain  See above.     - tiZANidine (ZANAFLEX) 2 MG tablet; Take 1 tablet (2 mg) by mouth 3 times daily.            Patient Instructions   Sinus pressure:   Use flonase daily until symptoms improve.   Take prednisone as prescribed. It can increase hunger, thirst, urination, and irritability while on it. Take in the morning to prevent insomnia.   If symptoms do not improve by Monday, please send me a message and we'll try an antibiotic. If symptoms worsen over the weekend, please go to  or ER.     Neck and back pain:   Use muscle relaxer as prescribed. Do  "not take prior to driving as it can impair your driving.     Wendy Benz is a 63 year old, presenting for the following health issues:  Headache        10/29/2024     1:46 PM   Additional Questions   Roomed by Yany   Accompanied by none         10/29/2024     1:46 PM   Patient Reported Additional Medications   Patient reports taking the following new medications Advil     History of Present Illness       Back Pain:  She presents for follow up of back pain. Patient's back pain is a new problem.    Original cause of back pain: not sure  First noticed back pain: yesterday  Patient feels back pain: dailyLocation of back pain:  Right middle of back, left middle of back, right side of neck, left side of neck and right shoulder  Description of back pain: dull ache  Back pain spreads: nowhere    Since patient first noticed back pain, pain is: unchanged  Does back pain interfere with her job:  No  On a scale of 1-10 (10 being the worst), patient describes pain as:  3  What makes back pain worse: other   Acupuncture: not tried  Acetaminophen: not tried  Activity or exercise: not tried  Chiropractor:  Not tried  Cold: not tried  Heat: not helpful  Massage: not tried  Muscle relaxants: not tried  NSAIDS: helpful  Opioids: not tried  Physical Therapy: not tried  Rest: not helpful  Stretching: not helpful  Surgery: not tried  TENS unit: not tried  Topical pain relievers: not tried  Other healthcare providers patient is seeing for back pain: None    Headaches:   Since the patient's last clinic visit, headaches are: no change  The patient is getting headaches:  My headache is dull or else pounding in the front of my head  She is able to do normal daily activities when she has a migraine.  The patient is taking the following rescue/relief medications:  Ibuprofen (Advil, Motrin)   Patient states \"I get some relief\" from the rescue/relief medications.   The patient is taking the following medications to prevent migraines:  No " "medications to prevent migraines  In the past 4 weeks, the patient has gone to an Urgent Care or Emergency Room 0 times times due to headaches.    Reason for visit:  Headache, pain in my right eye socket, neck pain, upper back pain  Symptom onset:  1-2 weeks ago  Symptoms include:  Throbbing headache, stiff neck, when I walk my upper back is bothering me  Symptom intensity:  Moderate  Symptom progression:  Staying the same  Had these symptoms before:  No  What makes it worse:  No  What makes it better:  Advil   She is taking medications regularly.           Additional provider notes: Patient presents in clinic for the following:     HA: started 1 week ago, then developed pain in her right eye socket, then her neck was stiff. Now within the past day her upper back hurts. She has been taking advil, but no improvement in pounding headache. Pressure is in her right eye. No vision changes. Hurts to bend over due to pressure.       No Known Allergies    Current Outpatient Medications   Medication Sig Dispense Refill    Multiple vitamin TABS Take 1 tablet by mouth      Fluticasone Propionate (FLONASE NA)        No current facility-administered medications for this visit.       Past Medical History:   Diagnosis Date    Gallbladder polyp             Review of Systems  Constitutional, HEENT, cardiovascular, pulmonary, gi and gu systems are negative, except as otherwise noted.      Objective    /76 (BP Location: Right arm, Patient Position: Sitting, Cuff Size: Adult Regular)   Pulse 92   Temp 98.8  F (37.1  C) (Oral)   Resp 16   Ht 1.727 m (5' 8\")   Wt 67.4 kg (148 lb 9.6 oz)   BMI 22.59 kg/m    Body mass index is 22.59 kg/m .  Physical Exam  Vitals reviewed.   Constitutional:       General: She is not in acute distress.     Appearance: Normal appearance. She is normal weight. She is not ill-appearing or toxic-appearing.   HENT:      Right Ear: A middle ear effusion is present. Tympanic membrane is bulging. " Tympanic membrane is not erythematous.      Left Ear: Tympanic membrane is bulging. Tympanic membrane is not erythematous.      Nose: Nose normal.      Mouth/Throat:      Mouth: Mucous membranes are moist.      Pharynx: Oropharynx is clear. No posterior oropharyngeal erythema.      Tonsils: 2+ on the right. 1+ on the left.   Cardiovascular:      Rate and Rhythm: Normal rate and regular rhythm.      Pulses: Normal pulses.      Heart sounds: Normal heart sounds.   Pulmonary:      Effort: Pulmonary effort is normal.      Breath sounds: Normal breath sounds.   Musculoskeletal:         General: Normal range of motion.      Cervical back: Normal range of motion and neck supple. No tenderness.   Lymphadenopathy:      Cervical: No cervical adenopathy.   Skin:     General: Skin is warm and dry.   Neurological:      Mental Status: She is alert and oriented to person, place, and time.   Psychiatric:         Behavior: Behavior normal.                    Signed Electronically by: Omaira Salmeron DNP

## 2025-05-08 ENCOUNTER — PATIENT OUTREACH (OUTPATIENT)
Dept: CARE COORDINATION | Facility: CLINIC | Age: 65
End: 2025-05-08
Payer: COMMERCIAL

## 2025-05-22 ENCOUNTER — PATIENT OUTREACH (OUTPATIENT)
Dept: CARE COORDINATION | Facility: CLINIC | Age: 65
End: 2025-05-22
Payer: COMMERCIAL

## 2025-06-30 ENCOUNTER — TRANSFERRED RECORDS (OUTPATIENT)
Dept: HEALTH INFORMATION MANAGEMENT | Facility: CLINIC | Age: 65
End: 2025-06-30
Payer: COMMERCIAL

## 2025-07-17 ENCOUNTER — OFFICE VISIT (OUTPATIENT)
Dept: FAMILY MEDICINE | Facility: CLINIC | Age: 65
End: 2025-07-17
Payer: COMMERCIAL

## 2025-07-17 ENCOUNTER — ANCILLARY PROCEDURE (OUTPATIENT)
Dept: GENERAL RADIOLOGY | Facility: CLINIC | Age: 65
End: 2025-07-17
Attending: PHYSICIAN ASSISTANT
Payer: COMMERCIAL

## 2025-07-17 VITALS
WEIGHT: 156 LBS | TEMPERATURE: 97.5 F | DIASTOLIC BLOOD PRESSURE: 72 MMHG | HEART RATE: 80 BPM | HEIGHT: 68 IN | BODY MASS INDEX: 23.64 KG/M2 | SYSTOLIC BLOOD PRESSURE: 130 MMHG | OXYGEN SATURATION: 99 % | RESPIRATION RATE: 16 BRPM

## 2025-07-17 DIAGNOSIS — M25.572 PAIN IN JOINT INVOLVING ANKLE AND FOOT, LEFT: ICD-10-CM

## 2025-07-17 DIAGNOSIS — M25.572 PAIN IN JOINT INVOLVING ANKLE AND FOOT, LEFT: Primary | ICD-10-CM

## 2025-07-17 ASSESSMENT — PAIN SCALES - GENERAL: PAINLEVEL_OUTOF10: NO PAIN (0)

## 2025-07-17 NOTE — PROGRESS NOTES
"  {PROVIDER CHARTING PREFERENCE:687574}    Wendy Benz is a 64 year old, presenting for the following health issues:  Injury        7/17/2025     1:42 PM   Additional Questions   Roomed by Kristi Faith CMA   Accompanied by N/A         7/17/2025     1:42 PM   Patient Reported Additional Medications   Patient reports taking the following new medications No new medications     Injury    History of Present Illness       Reason for visit:  Twisted my left ankle  Symptom onset:  1-3 days ago  Symptoms include:  Pain when bearing weight  Symptom intensity:  Moderate  Symptom progression:  Staying the same  Had these symptoms before:  No  What makes it worse:  Walking  What makes it better:  Resting   She is taking medications regularly.      Injury occurred yesterday around 4 pm.  Patient notes that they did not hear any snaps or pops.  There is swelling noted but no redness or bruising.  Pain is most located on the top of the foot and on the medial side (left ankle).  Walking/movement causes pain to increase.  Patient last iced it about 30 minutes ago.    {MA/LPN/RN Pre-Provider Visit Orders- hCG/UA/Strep (Optional):962452}  {SUPERLIST (Optional):063448}  {additonal problems for provider to add (Optional):628871}    {ROS Picklists (Optional):512063}      Objective    /72   Pulse 80   Temp 97.5  F (36.4  C) (Temporal)   Resp 16   Ht 1.727 m (5' 8\")   Wt 70.8 kg (156 lb)   SpO2 99%   BMI 23.72 kg/m    Body mass index is 23.72 kg/m .  Physical Exam   {Exam List (Optional):331707}    {Diagnostic Test Results (Optional):940553}        Signed Electronically by: CHUCK Wallace  {Email feedback regarding this note to [email or web address]   :514528}  "

## 2025-07-27 ENCOUNTER — HEALTH MAINTENANCE LETTER (OUTPATIENT)
Age: 65
End: 2025-07-27

## 2025-08-20 ENCOUNTER — TRANSFERRED RECORDS (OUTPATIENT)
Dept: HEALTH INFORMATION MANAGEMENT | Facility: CLINIC | Age: 65
End: 2025-08-20
Payer: COMMERCIAL

## (undated) DEVICE — ANTIFOG SOLUTION W/FOAM PAD CF-1001

## (undated) DEVICE — SOL NACL 0.9% INJ 1000ML BAG 07983-09

## (undated) DEVICE — NDL 25GA 1.5" 305127

## (undated) DEVICE — SUCTION IRR STRYKERFLOW II W/TIP 250-070-520

## (undated) DEVICE — DRSG STERI STRIP 1/4X3" R1541

## (undated) DEVICE — DEVICE SUTURE GRASPER TROCAR CLOSURE 14GA PMITCSG

## (undated) DEVICE — ENDO TROCAR SLEEVE KII Z-THREADED 05X100MM CTS02

## (undated) DEVICE — ESU GROUND PAD ADULT W/CORD E7507

## (undated) DEVICE — SU VICRYL 0 UR-6 27" J603H

## (undated) DEVICE — SUCTION CANISTER MEDIVAC LINER 1500ML W/LID 65651-515

## (undated) DEVICE — GLOVE PROTEXIS BLUE W/NEU-THERA 8.0  2D73EB80

## (undated) DEVICE — BLADE SWITCH SCISSORS TIP 5MM 89-5100B

## (undated) DEVICE — CLIP APPLIER ENDO 5MM M/L LIGAMAX EL5ML

## (undated) DEVICE — DRAPE LAPAROSCOPIC ABDOMINAL ASTOUND 106X124" LF 9438

## (undated) DEVICE — ENDO SCOPE WARMER TM500

## (undated) DEVICE — SOL WATER IRRIG 1000ML BOTTLE 07139-09

## (undated) DEVICE — SU VICRYL 3-0 SH 27" UND J416H

## (undated) DEVICE — PREP CHLORAPREP 26ML TINTED ORANGE  260815

## (undated) DEVICE — ENDO TROCAR FIRST ENTRY KII FIOS Z-THRD 05X100MM CTF03

## (undated) DEVICE — TUBING INSUFFLATION W/FILTER CPC TO LUER 620-030-301

## (undated) DEVICE — ADH SKIN CLOSURE PREMIERPRO EXOFIN 1.0ML 3470

## (undated) DEVICE — PACK MINOR SBA15MIFSE

## (undated) DEVICE — GLOVE PROTEXIS W/NEU-THERA 8.0  2D73TE80

## (undated) DEVICE — BLADE KNIFE SURG 11 371111

## (undated) RX ORDER — GABAPENTIN 300 MG/1
CAPSULE ORAL
Status: DISPENSED
Start: 2020-06-19

## (undated) RX ORDER — ACETAMINOPHEN 325 MG/1
TABLET ORAL
Status: DISPENSED
Start: 2020-06-19

## (undated) RX ORDER — ONDANSETRON 2 MG/ML
INJECTION INTRAMUSCULAR; INTRAVENOUS
Status: DISPENSED
Start: 2020-06-19

## (undated) RX ORDER — OXYCODONE HYDROCHLORIDE 5 MG/1
TABLET ORAL
Status: DISPENSED
Start: 2020-06-19

## (undated) RX ORDER — FENTANYL CITRATE 50 UG/ML
INJECTION, SOLUTION INTRAMUSCULAR; INTRAVENOUS
Status: DISPENSED
Start: 2020-06-19

## (undated) RX ORDER — DEXAMETHASONE SODIUM PHOSPHATE 4 MG/ML
INJECTION, SOLUTION INTRA-ARTICULAR; INTRALESIONAL; INTRAMUSCULAR; INTRAVENOUS; SOFT TISSUE
Status: DISPENSED
Start: 2020-06-19

## (undated) RX ORDER — KETOROLAC TROMETHAMINE 30 MG/ML
INJECTION, SOLUTION INTRAMUSCULAR; INTRAVENOUS
Status: DISPENSED
Start: 2020-06-19

## (undated) RX ORDER — CEFAZOLIN SODIUM 2 G/100ML
INJECTION, SOLUTION INTRAVENOUS
Status: DISPENSED
Start: 2020-06-19